# Patient Record
Sex: FEMALE | URBAN - METROPOLITAN AREA
[De-identification: names, ages, dates, MRNs, and addresses within clinical notes are randomized per-mention and may not be internally consistent; named-entity substitution may affect disease eponyms.]

---

## 2022-12-08 ENCOUNTER — EMPLOYEE HEALTH (OUTPATIENT)
Dept: OTHER | Facility: HOSPITAL | Age: 27
End: 2022-12-08
Attending: PREVENTIVE MEDICINE

## 2022-12-08 DIAGNOSIS — Z01.84 IMMUNITY STATUS TESTING: ICD-10-CM

## 2022-12-08 DIAGNOSIS — Z11.1 SCREENING-PULMONARY TB: Primary | ICD-10-CM

## 2022-12-08 PROCEDURE — 86480 TB TEST CELL IMMUN MEASURE: CPT

## 2022-12-08 PROCEDURE — 86787 VARICELLA-ZOSTER ANTIBODY: CPT

## 2022-12-09 LAB — VZV IGG SER IA-ACNC: 998.8 (ref 165–?)

## 2022-12-12 LAB
M TB IFN-G CD4+ T-CELLS BLD-ACNC: 0.02 IU/ML
M TB TUBERC IFN-G BLD QL: NEGATIVE
M TB TUBERC IGNF/MITOGEN IGNF CONTROL: >10 IU/ML
QFT TB1 AG MINUS NIL: 0.01 IU/ML
QFT TB2 AG MINUS NIL: 0 IU/ML

## 2023-02-14 ENCOUNTER — TELEPHONE (OUTPATIENT)
Dept: INTERNAL MEDICINE CLINIC | Facility: HOSPITAL | Age: 28
End: 2023-02-14

## 2023-04-12 PROBLEM — R61 HYPERHIDROSIS: Status: ACTIVE | Noted: 2021-09-17

## 2023-04-12 PROBLEM — E66.9 OBESITY (BMI 35.0-39.9 WITHOUT COMORBIDITY): Status: ACTIVE | Noted: 2022-03-23

## 2023-04-12 PROBLEM — F41.1 GAD (GENERALIZED ANXIETY DISORDER): Status: ACTIVE | Noted: 2017-01-03

## 2023-04-17 ENCOUNTER — TELEPHONE (OUTPATIENT)
Dept: INTERNAL MEDICINE CLINIC | Facility: HOSPITAL | Age: 28
End: 2023-04-17

## 2023-06-07 ENCOUNTER — TELEPHONE (OUTPATIENT)
Dept: INTERNAL MEDICINE CLINIC | Facility: HOSPITAL | Age: 28
End: 2023-06-07

## 2023-06-07 DIAGNOSIS — Z20.822 SUSPECTED 2019 NOVEL CORONAVIRUS INFECTION: Primary | ICD-10-CM

## 2023-06-08 NOTE — TELEPHONE ENCOUNTER
Results and RTW guidelines:    COVID RESULT:    [x] Viewed by employee in [address] RTW plan and instructions as indicated on triage call. Manager notified. Estimated RTW date:   [] Discussed with employee   [x] Unable to reach by phone. Sent via [place] message      Test type:    [] Rapid         [] Alinity         [x] Outside test:     [x] Positive     - Employee should quarantine at home for at least 5 days (day 1 is day after sx onset) , follow the CDC guidelines for cleaning and                              quarantining; see CDC.gov   -This employee may RTW on day 6 if asymptomatic or mildly symptomatic (with improving symptoms). Call Employee Health on day 5 if unable to return on day 6 after                      symptom onset.    -This employee needs to call Employee Health on day 5 after symptom onset. The employee needs to be cleared by [facility]. - Monitor symptoms and temperature                 - Notify PCP of result                 - Seek emergent care with worsening symptoms   - If employee is still experiencing severe symptoms on day 5 must make a RTW appt with [facility], Employee will not be cleared if:    1. Has consistent cough, shortness of breath or fatigue that restricts your physical activities    2. Is still feeling \"unwell\"    3. Within 15 days of hospitalization for COVID    4. Within 20 days of intubation for COVID    5.  Still has a fever, vomiting or diarrhea   - Keep communication open with management about RTW and if symptoms worsen                - If outside testing completed, bring a copy of result to RTW appointment           Notes:     RTW PLAN:    [x]  If COVID positive results, off work minimum of 5 days from positive test or onset of symptoms (day 0)        On day 5, if asymptomatic or mildly symptomatic (with improving symptoms) may return to work day 6          On day 5, if symptomatic, call Employee Health for RTW screening        []  COVID positive result - call Employee Health on day 5 after symptom onset. The employee needs to be cleared by Employee Health to RTW. [] RTW immediately, continue to monitor for sx  [] RTW when sx improve; must be fever free for 24 hours w/o medications, Diarrhea/Vomiting free for 24 hours w/o medications  [] Alinity ordered; continue to monitor sx and call for new/worsening sx.   Discuss RTW guidelines with manager  [] May continue to work  [] Follow up with PCP  [] Home until further instruction from hotline with Alinity results  INSTRUCTIONS PROVIDED:  [x]  Plan as noted above  []  Length of time to obtain results   [x]  Quarantine instructions  [x]  Masking protocol   [x]  S/S of worsening infection/condition and importance of prompt medical re-evaluation including when to seek emergency care  [] If symptoms develop, stay home and call hotline for rapid test order    Estimated RTW date:  6/12    [] The employee voiced understanding of above plan/instructions  [x] Manager Notified

## 2023-07-05 ENCOUNTER — TELEPHONE (OUTPATIENT)
Dept: INTERNAL MEDICINE CLINIC | Facility: HOSPITAL | Age: 28
End: 2023-07-05

## 2023-07-05 NOTE — TELEPHONE ENCOUNTER
[x] [address] [facility]  []MILE   [] [facility]  Manager : Enedina Montana    HAVE YOU RECEIVED THE COVID-19 Vaccine? Yes [x]    No []          If yes, date(s) received: 12/21/20; 1/15/21; 10/1/21           Which vaccine:  Pfizer [x]     Raymond Buck []    J&J []      SYMPTOMS (reported via dashboard):  [] asymptomatic  [] symptomatic  [x] GI symptoms only    Symptom onset date: 7/5  Fever   > 100F             Yes []      Cough                          Yes []      Shortness of breath  Yes []      Congestion                 Yes []      Runny nose                Yes []        Loss of Smell              Yes []        Loss of Taste             Yes []       Sore throat                 Yes []       Fatigue                        Yes []       Body Aches                Yes []        Chills                           Yes []        Headache                   Yes []             GI symptoms             Yes [x]     No []                     Nausea   [x]          Vomiting            [x]                                    Diarrhea  []          Upset stomach [x]      Employee has positive COVID Exposure? Yes []     No [x]    Date of exposure:     []  Coworker                       [] patient                        [] Family/friend    Employee has a history of Covid?   Yes [x]     No []   If Yes, when: 6/7/23    When was the last shift you worked?: 7/3      PLAN:     COVID-19 testing ordered: [] Rapid    [] Alinity              Date test is to be taken:       []  Outside testing                           Notes:    INSTRUCTIONS PROVIDED:    []  Employee was instructed to call Central scheduling at [phone] or use [employer]estone to make an appointment for their testing and once at the site remain in their vehicle and call [phone] to register for the appointment  [x]  May return to work if employee remains fever, vomiting, and diarrhea free  []  May continue to work if remains asymptomatic and views negative result in Scheduling [employer]  []  Follow up for condition update when resulting  []  If symptoms develop, stay home and call hotline for rapid test order  []  If COVID positive results, off work minimum of 5 days from positive test or onset of symptoms (day 0)    []      On day 5, if asymptomatic or mildly symptomatic (with improving symptoms) may return to work day 6  []      On day 5, if symptomatic, call Employee Health for RTW screening        [x]  Plan noted above  []  Length of time to obtain results  []  Quarantine instructions  []  S/S of worsening infection/condition and importance of prompt medical re-evaluation including when to seek emergency care.    [] The employee voiced understanding

## 2023-07-24 ENCOUNTER — OFFICE VISIT (OUTPATIENT)
Dept: FAMILY MEDICINE CLINIC | Facility: CLINIC | Age: 28
End: 2023-07-24
Payer: COMMERCIAL

## 2023-07-24 VITALS
BODY MASS INDEX: 36.88 KG/M2 | HEIGHT: 67 IN | HEART RATE: 84 BPM | DIASTOLIC BLOOD PRESSURE: 76 MMHG | WEIGHT: 235 LBS | SYSTOLIC BLOOD PRESSURE: 126 MMHG | TEMPERATURE: 98 F

## 2023-07-24 DIAGNOSIS — F32.1 MODERATE MAJOR DEPRESSION (HCC): ICD-10-CM

## 2023-07-24 DIAGNOSIS — F41.1 GENERALIZED ANXIETY DISORDER: ICD-10-CM

## 2023-07-24 PROCEDURE — 3074F SYST BP LT 130 MM HG: CPT | Performed by: STUDENT IN AN ORGANIZED HEALTH CARE EDUCATION/TRAINING PROGRAM

## 2023-07-24 PROCEDURE — 3078F DIAST BP <80 MM HG: CPT | Performed by: STUDENT IN AN ORGANIZED HEALTH CARE EDUCATION/TRAINING PROGRAM

## 2023-07-24 PROCEDURE — 99214 OFFICE O/P EST MOD 30 MIN: CPT | Performed by: STUDENT IN AN ORGANIZED HEALTH CARE EDUCATION/TRAINING PROGRAM

## 2023-07-24 PROCEDURE — 3008F BODY MASS INDEX DOCD: CPT | Performed by: STUDENT IN AN ORGANIZED HEALTH CARE EDUCATION/TRAINING PROGRAM

## 2023-07-24 RX ORDER — ESCITALOPRAM OXALATE 5 MG/1
5 TABLET ORAL DAILY
Qty: 90 TABLET | Refills: 0 | Status: SHIPPED | OUTPATIENT
Start: 2023-07-24

## 2023-07-24 RX ORDER — FLUTICASONE PROPIONATE 50 MCG
2 SPRAY, SUSPENSION (ML) NASAL DAILY
COMMUNITY
Start: 2023-06-07

## 2023-07-24 RX ORDER — CHOLECALCIFEROL (VITAMIN D3) 125 MCG
3000 CAPSULE ORAL
COMMUNITY
Start: 2023-05-22

## 2023-10-12 ENCOUNTER — EKG ENCOUNTER (OUTPATIENT)
Dept: LAB | Age: 28
End: 2023-10-12
Attending: STUDENT IN AN ORGANIZED HEALTH CARE EDUCATION/TRAINING PROGRAM
Payer: COMMERCIAL

## 2023-10-12 DIAGNOSIS — F41.1 GENERALIZED ANXIETY DISORDER: ICD-10-CM

## 2023-10-12 DIAGNOSIS — F32.1 MODERATE MAJOR DEPRESSION (HCC): ICD-10-CM

## 2023-10-12 LAB
ATRIAL RATE: 70 BPM
P AXIS: 36 DEGREES
P-R INTERVAL: 166 MS
Q-T INTERVAL: 386 MS
QRS DURATION: 76 MS
QTC CALCULATION (BEZET): 416 MS
R AXIS: 26 DEGREES
T AXIS: 27 DEGREES
VENTRICULAR RATE: 70 BPM

## 2023-10-12 PROCEDURE — 93010 ELECTROCARDIOGRAM REPORT: CPT | Performed by: INTERNAL MEDICINE

## 2023-10-12 PROCEDURE — 93005 ELECTROCARDIOGRAM TRACING: CPT

## 2023-10-16 DIAGNOSIS — F32.1 MODERATE MAJOR DEPRESSION (HCC): ICD-10-CM

## 2023-10-16 DIAGNOSIS — F41.1 GENERALIZED ANXIETY DISORDER: ICD-10-CM

## 2023-10-17 RX ORDER — ESCITALOPRAM OXALATE 5 MG/1
5 TABLET ORAL DAILY
Qty: 90 TABLET | Refills: 0 | Status: SHIPPED | OUTPATIENT
Start: 2023-10-17 | End: 2024-01-18

## 2023-10-17 NOTE — TELEPHONE ENCOUNTER
Escitalopram 5 mg    Last time medication was refilled 7/24/23  Quantity and number of refills 90 w/ 0   Last OV 7/24/23  Next OV 10/26/23

## 2023-10-26 ENCOUNTER — OFFICE VISIT (OUTPATIENT)
Dept: FAMILY MEDICINE CLINIC | Facility: CLINIC | Age: 28
End: 2023-10-26

## 2023-10-26 VITALS
TEMPERATURE: 98 F | DIASTOLIC BLOOD PRESSURE: 84 MMHG | OXYGEN SATURATION: 96 % | HEART RATE: 84 BPM | HEIGHT: 67 IN | WEIGHT: 240 LBS | BODY MASS INDEX: 37.67 KG/M2 | RESPIRATION RATE: 16 BRPM | SYSTOLIC BLOOD PRESSURE: 124 MMHG

## 2023-10-26 DIAGNOSIS — F41.1 GENERALIZED ANXIETY DISORDER: ICD-10-CM

## 2023-10-26 DIAGNOSIS — Z00.00 WELLNESS EXAMINATION: Primary | ICD-10-CM

## 2023-10-26 DIAGNOSIS — F32.1 MODERATE MAJOR DEPRESSION (HCC): ICD-10-CM

## 2023-10-26 DIAGNOSIS — K52.9 CHRONIC DIARRHEA: ICD-10-CM

## 2023-10-26 PROCEDURE — 3079F DIAST BP 80-89 MM HG: CPT | Performed by: STUDENT IN AN ORGANIZED HEALTH CARE EDUCATION/TRAINING PROGRAM

## 2023-10-26 PROCEDURE — 99214 OFFICE O/P EST MOD 30 MIN: CPT | Performed by: STUDENT IN AN ORGANIZED HEALTH CARE EDUCATION/TRAINING PROGRAM

## 2023-10-26 PROCEDURE — 3074F SYST BP LT 130 MM HG: CPT | Performed by: STUDENT IN AN ORGANIZED HEALTH CARE EDUCATION/TRAINING PROGRAM

## 2023-10-26 PROCEDURE — 99395 PREV VISIT EST AGE 18-39: CPT | Performed by: STUDENT IN AN ORGANIZED HEALTH CARE EDUCATION/TRAINING PROGRAM

## 2023-10-26 PROCEDURE — 3008F BODY MASS INDEX DOCD: CPT | Performed by: STUDENT IN AN ORGANIZED HEALTH CARE EDUCATION/TRAINING PROGRAM

## 2023-10-30 ENCOUNTER — MED REC SCAN ONLY (OUTPATIENT)
Dept: FAMILY MEDICINE CLINIC | Facility: CLINIC | Age: 28
End: 2023-10-30

## 2023-12-07 ENCOUNTER — LAB REQUISITION (OUTPATIENT)
Dept: LAB | Facility: HOSPITAL | Age: 28
End: 2023-12-07
Payer: COMMERCIAL

## 2023-12-07 DIAGNOSIS — D48.5 NEOPLASM OF UNCERTAIN BEHAVIOR OF SKIN: ICD-10-CM

## 2023-12-07 PROCEDURE — 88305 TISSUE EXAM BY PATHOLOGIST: CPT | Performed by: STUDENT IN AN ORGANIZED HEALTH CARE EDUCATION/TRAINING PROGRAM

## 2023-12-11 ENCOUNTER — TELEPHONE (OUTPATIENT)
Dept: INTERNAL MEDICINE CLINIC | Facility: HOSPITAL | Age: 28
End: 2023-12-11

## 2023-12-11 NOTE — TELEPHONE ENCOUNTER
[x] [facility] [facility]  []MILE   [] [address] [facility]  Manager : Rosalind Limon    HAVE YOU RECEIVED THE COVID-19 Vaccine? Yes [x]    No []          If yes, date(s) received:  10/1/21          Which vaccine:  Pfizer [x]     Daniela Senate []    J&J []      SYMPTOMS (reported via dashboard):  [] asymptomatic  [] symptomatic  [x] GI symptoms only    Symptom onset date: 12/11  Fever   > 100F             Yes []      Cough                          Yes []      Shortness of breath  Yes []      Congestion                 Yes []      Runny nose                Yes []        Loss of Smell              Yes []        Loss of Taste             Yes []       Sore throat                 Yes []       Fatigue                        Yes []       Body Aches                Yes []        Chills                           Yes []        Headache                   Yes []             GI symptoms             Yes [x]     No []                     Nausea   [x]          Vomiting            [x]                                    Diarrhea  [x]          Upset stomach [x]      Employee has positive COVID Exposure? Yes []     No [x]    Date of exposure:   []  Coworker                       [] patient                        [] Family/friend    Employee has a history of Covid?   Yes [x]     No []   If Yes, when: 6/6/23    When was the last shift you worked?: 12/8      PLAN:     [id number] testing ordered: [] Rapid    [] Alinity              Date test is to be taken:       []  Outside testing                           Notes:    INSTRUCTIONS PROVIDED:    []  Employee was instructed to call Central scheduling at [phone] or use [employer] to make an appointment for their testing   [x]  May return to work if employee remains fever, vomiting, and diarrhea free  []  May continue to work if remains asymptomatic and views negative result in [address]  []  Follow up for condition update when resulting  []  If symptoms develop, stay home and call hotline for rapid test order  []  If COVID positive results, off work minimum of 5 days from positive test or onset of symptoms (day 0)     [x]  Plan noted above  []  Length of time to obtain results  []  Quarantine instructions  []  S/S of worsening infection/condition and importance of prompt medical re-evaluation including when to seek emergency care.    [x] The employee voiced understanding

## 2023-12-22 ENCOUNTER — MED REC SCAN ONLY (OUTPATIENT)
Dept: FAMILY MEDICINE CLINIC | Facility: CLINIC | Age: 28
End: 2023-12-22

## 2024-03-01 PROCEDURE — 88305 TISSUE EXAM BY PATHOLOGIST: CPT | Performed by: STUDENT IN AN ORGANIZED HEALTH CARE EDUCATION/TRAINING PROGRAM

## 2024-03-04 ENCOUNTER — LAB REQUISITION (OUTPATIENT)
Dept: LAB | Facility: HOSPITAL | Age: 29
End: 2024-03-04
Payer: COMMERCIAL

## 2024-03-04 DIAGNOSIS — D48.5 NEOPLASM OF UNCERTAIN BEHAVIOR OF SKIN: ICD-10-CM

## 2024-05-08 DIAGNOSIS — F32.1 MODERATE MAJOR DEPRESSION (HCC): ICD-10-CM

## 2024-05-08 DIAGNOSIS — F41.1 GENERALIZED ANXIETY DISORDER: ICD-10-CM

## 2024-05-08 NOTE — TELEPHONE ENCOUNTER
Last office visit: 10/26/2023  Last Refill: 1/18/2024  Return To Clinic: 4/26/2024  Protocol:   Medication Quantity Refills Start End   ESCITALOPRAM 5 MG Oral Tab 90 tablet 0 1/18/2024 --   Sig:   TAKE 1 TABLET (5 MG TOTAL) BY MOUTH DAILY.     Route:   Oral       Please call patient to schedule medication follow up then route to Dr. Ivan

## 2024-05-09 RX ORDER — ESCITALOPRAM OXALATE 5 MG/1
5 TABLET ORAL DAILY
Qty: 90 TABLET | Refills: 0 | Status: SHIPPED | OUTPATIENT
Start: 2024-05-09

## 2024-05-16 ENCOUNTER — LAB ENCOUNTER (OUTPATIENT)
Dept: LAB | Age: 29
End: 2024-05-16
Attending: STUDENT IN AN ORGANIZED HEALTH CARE EDUCATION/TRAINING PROGRAM

## 2024-05-16 DIAGNOSIS — K52.9 CHRONIC DIARRHEA: ICD-10-CM

## 2024-05-16 DIAGNOSIS — Z00.00 WELLNESS EXAMINATION: ICD-10-CM

## 2024-05-16 LAB
ALBUMIN SERPL-MCNC: 4.6 G/DL (ref 3.4–5)
ALBUMIN/GLOB SERPL: 1.3 {RATIO} (ref 1–2)
ALP LIVER SERPL-CCNC: 91 U/L
ALT SERPL-CCNC: 31 U/L
ANION GAP SERPL CALC-SCNC: 9 MMOL/L (ref 0–18)
AST SERPL-CCNC: 12 U/L (ref 15–37)
BASOPHILS # BLD AUTO: 0.04 X10(3) UL (ref 0–0.2)
BASOPHILS NFR BLD AUTO: 0.4 %
BILIRUB SERPL-MCNC: 0.6 MG/DL (ref 0.1–2)
BUN BLD-MCNC: 10 MG/DL (ref 9–23)
CALCIUM BLD-MCNC: 9.5 MG/DL (ref 8.5–10.1)
CHLORIDE SERPL-SCNC: 106 MMOL/L (ref 98–112)
CHOLEST SERPL-MCNC: 215 MG/DL (ref ?–200)
CO2 SERPL-SCNC: 24 MMOL/L (ref 21–32)
CREAT BLD-MCNC: 0.66 MG/DL
EGFRCR SERPLBLD CKD-EPI 2021: 123 ML/MIN/1.73M2 (ref 60–?)
EOSINOPHIL # BLD AUTO: 0.03 X10(3) UL (ref 0–0.7)
EOSINOPHIL NFR BLD AUTO: 0.3 %
ERYTHROCYTE [DISTWIDTH] IN BLOOD BY AUTOMATED COUNT: 12.2 %
FASTING PATIENT LIPID ANSWER: YES
FASTING STATUS PATIENT QL REPORTED: YES
GLOBULIN PLAS-MCNC: 3.5 G/DL (ref 2.8–4.4)
GLUCOSE BLD-MCNC: 85 MG/DL (ref 70–99)
HCT VFR BLD AUTO: 40.5 %
HDLC SERPL-MCNC: 40 MG/DL (ref 40–59)
HGB BLD-MCNC: 14.1 G/DL
IGA SERPL-MCNC: 134.4 MG/DL (ref 70–312)
IMM GRANULOCYTES # BLD AUTO: 0.03 X10(3) UL (ref 0–1)
IMM GRANULOCYTES NFR BLD: 0.3 %
LDLC SERPL CALC-MCNC: 143 MG/DL (ref ?–100)
LYMPHOCYTES # BLD AUTO: 2.55 X10(3) UL (ref 1–4)
LYMPHOCYTES NFR BLD AUTO: 24.6 %
MCH RBC QN AUTO: 31.1 PG (ref 26–34)
MCHC RBC AUTO-ENTMCNC: 34.8 G/DL (ref 31–37)
MCV RBC AUTO: 89.2 FL
MONOCYTES # BLD AUTO: 0.65 X10(3) UL (ref 0.1–1)
MONOCYTES NFR BLD AUTO: 6.3 %
NEUTROPHILS # BLD AUTO: 7.07 X10 (3) UL (ref 1.5–7.7)
NEUTROPHILS # BLD AUTO: 7.07 X10(3) UL (ref 1.5–7.7)
NEUTROPHILS NFR BLD AUTO: 68.1 %
NONHDLC SERPL-MCNC: 175 MG/DL (ref ?–130)
OSMOLALITY SERPL CALC.SUM OF ELEC: 286 MOSM/KG (ref 275–295)
PLATELET # BLD AUTO: 390 10(3)UL (ref 150–450)
POTASSIUM SERPL-SCNC: 3.5 MMOL/L (ref 3.5–5.1)
PROT SERPL-MCNC: 8.1 G/DL (ref 6.4–8.2)
RBC # BLD AUTO: 4.54 X10(6)UL
SODIUM SERPL-SCNC: 139 MMOL/L (ref 136–145)
T4 FREE SERPL-MCNC: 1.1 NG/DL (ref 0.8–1.7)
TRIGL SERPL-MCNC: 178 MG/DL (ref 30–149)
TSI SER-ACNC: 1.91 MIU/ML (ref 0.36–3.74)
VLDLC SERPL CALC-MCNC: 33 MG/DL (ref 0–30)
WBC # BLD AUTO: 10.4 X10(3) UL (ref 4–11)

## 2024-05-16 PROCEDURE — 86364 TISS TRNSGLTMNASE EA IG CLAS: CPT

## 2024-05-16 PROCEDURE — 84443 ASSAY THYROID STIM HORMONE: CPT

## 2024-05-16 PROCEDURE — 36415 COLL VENOUS BLD VENIPUNCTURE: CPT

## 2024-05-16 PROCEDURE — 85025 COMPLETE CBC W/AUTO DIFF WBC: CPT

## 2024-05-16 PROCEDURE — 80061 LIPID PANEL: CPT

## 2024-05-16 PROCEDURE — 84439 ASSAY OF FREE THYROXINE: CPT

## 2024-05-16 PROCEDURE — 80053 COMPREHEN METABOLIC PANEL: CPT

## 2024-05-16 PROCEDURE — 82784 ASSAY IGA/IGD/IGG/IGM EACH: CPT

## 2024-05-17 LAB — TTG IGA SER-ACNC: 0.2 U/ML (ref ?–7)

## 2024-09-09 ENCOUNTER — TELEPHONE (OUTPATIENT)
Dept: FAMILY MEDICINE CLINIC | Facility: CLINIC | Age: 29
End: 2024-09-09

## 2024-09-09 ENCOUNTER — OFFICE VISIT (OUTPATIENT)
Dept: FAMILY MEDICINE CLINIC | Facility: CLINIC | Age: 29
End: 2024-09-09
Payer: COMMERCIAL

## 2024-09-09 VITALS
HEIGHT: 67 IN | BODY MASS INDEX: 36.73 KG/M2 | TEMPERATURE: 97 F | SYSTOLIC BLOOD PRESSURE: 108 MMHG | WEIGHT: 234 LBS | HEART RATE: 90 BPM | RESPIRATION RATE: 16 BRPM | DIASTOLIC BLOOD PRESSURE: 56 MMHG

## 2024-09-09 DIAGNOSIS — R10.2 SUPRAPUBIC PAIN: ICD-10-CM

## 2024-09-09 DIAGNOSIS — R10.32 LLQ PAIN: ICD-10-CM

## 2024-09-09 DIAGNOSIS — M54.6 ACUTE LEFT-SIDED THORACIC BACK PAIN: ICD-10-CM

## 2024-09-09 DIAGNOSIS — R07.89 LEFT-SIDED CHEST WALL PAIN: ICD-10-CM

## 2024-09-09 DIAGNOSIS — R31.29 OTHER MICROSCOPIC HEMATURIA: ICD-10-CM

## 2024-09-09 DIAGNOSIS — R10.12 COLICKY LUQ ABDOMINAL PAIN: Primary | ICD-10-CM

## 2024-09-09 DIAGNOSIS — R10.12 LUQ PAIN: ICD-10-CM

## 2024-09-09 DIAGNOSIS — R10.13 EPIGASTRIC ABDOMINAL PAIN: ICD-10-CM

## 2024-09-09 LAB
APPEARANCE: CLEAR
BILIRUBIN: NEGATIVE
CONTROL LINE PRESENT WITH A CLEAR BACKGROUND (YES/NO): YES YES/NO
GLUCOSE (URINE DIPSTICK): NEGATIVE MG/DL
KETONES (URINE DIPSTICK): NEGATIVE MG/DL
KIT LOT #: NORMAL NUMERIC
LEUKOCYTES: NEGATIVE
MULTISTIX LOT#: ABNORMAL NUMERIC
NITRITE, URINE: NEGATIVE
PH, URINE: 6.5 (ref 4.5–8)
PREGNANCY TEST, URINE: NEGATIVE
PROTEIN (URINE DIPSTICK): NEGATIVE MG/DL
SPECIFIC GRAVITY: 1.01 (ref 1–1.03)
URINE-COLOR: YELLOW
UROBILINOGEN,SEMI-QN: 0.2 MG/DL (ref 0–1.9)

## 2024-09-09 PROCEDURE — 87086 URINE CULTURE/COLONY COUNT: CPT | Performed by: STUDENT IN AN ORGANIZED HEALTH CARE EDUCATION/TRAINING PROGRAM

## 2024-09-09 RX ORDER — CETIRIZINE HYDROCHLORIDE 10 MG/1
TABLET ORAL
COMMUNITY

## 2024-09-09 NOTE — PROGRESS NOTES
[facility] Family Medicine Note  09/09/24    Chief Complaint   Patient presents with    Abdominal Pain     X6 days.  Pain on left side and radiates to back.       HPI:   Marjorie Lundy is a 29 year old female who presents for LUQ pain.    Was vomiting last week after a stomach bug.     Started in the front about a week ago. It was coming and going. Now it moved to the left middle back.  It is a dull throbbing. It is mild. Gets up to a 5-6/10 and burns a bit.     Tried ibuprofen and it hasn't help. Was nauseated this morning. Small emesis this morning. Tried icy hot without much help. Having sharper pain in left middle back.    Appetite fine. No worse pain with eating.     No rashes or bruising.    No recent travel.    No known sick contacts.     Bowel movements tend softer to looser. Lately some cramping that improves after bowel movement. Normal color.    No urinary issues.    Has had back flare ups, so was taking more ibuprofen.    No chest pain, shortness of breath, dizziness.     Wt Readings from Last 6 Encounters:   09/09/24 234 lb (106.1 kg)   05/23/24 235 lb 3.2 oz (106.7 kg)   10/26/23 240 lb (108.9 kg)   07/24/23 235 lb (106.6 kg)   04/12/23 237 lb (107.5 kg)       Past Medical History:    Allergic rhinitis    Anxiety    Depression    Esophageal reflux    Exercise-induced asthma (HCC)     Past Surgical History:   Procedure Laterality Date    Skin surgery  6/11/2015     Allergies   Allergen Reactions    Dairy Products DIARRHEA and NAUSEA AND VOMITING    Lactose NAUSEA AND VOMITING    Gluten Meal RASH     Irritability      cetirizine (ZYRTEC ALLERGY) 10 MG Oral Tab       escitalopram 5 MG Oral Tab Take 1 tablet (5 mg total) by mouth daily. 90 tablet 1    lactase (LACTAID) 3000 units Oral Tab Take 1 tablet (3,000 Units total) by mouth.      famotidine (PEPCID) 20 MG Oral Tab Take 1 tablet (20 mg total) by mouth as needed.       Social History     Socioeconomic History    Marital status:     Tobacco Use    Smoking status: Never     Passive exposure: Never    Smokeless tobacco: Never   Vaping Use    Vaping status: Never Used   Substance and Sexual Activity    Alcohol use: Yes     Alcohol/week: 1.0 - 2.0 standard drink of alcohol     Types: 1 - 2 Cans of beer per week     Comment: Rarely    Drug use: Never    Sexual activity: Yes     Partners: Male   Other Topics Concern    Caffeine Concern No    Stress Concern Yes    Weight Concern Yes     Comment: Trouble losing weight    Special Diet Yes     Comment: Mostly vegan    Exercise Yes    Seat Belt Yes    Self-Exams Yes     Counseling given: Not Answered    Family History   Problem Relation Age of Onset    Hypertension Father     Lipids Father     Cancer Maternal Grandfather         CLL    Diabetes Maternal Grandfather         Type 2, never on insulin    Heart Disorder Maternal Grandmother         A fib, heart failure    Lipids Maternal Grandmother     Pulmonary Disease Paternal Grandmother         Copd-chronic smoker     Family Status   Relation Status    Fa (Not Specified)    MGFA (Not Specified)    MGMA (Not Specified)    PGMA (Not Specified)        REVIEW OF SYSTEMS:   See HPI    EXAM:   /56   Pulse 90   Temp 97 °F (36.1 °C) (Temporal)   Resp 16   Ht 5' 7\" (1.702 m)   Wt 234 lb (106.1 kg)   LMP 08/17/2024 (Exact Date)   BMI 36.65 kg/m²  Estimated body mass index is 36.65 kg/m² as calculated from the following:    Height as of this encounter: 5' 7\" (1.702 m).    Weight as of this encounter: 234 lb (106.1 kg).   Vital signs reviewed. Appears stated age, well groomed.  Physical Exam:  GEN:  Patient is alert and oriented x3, no apparent distress  HEAD:  Normocephalic, atraumatic  HEENT:  no scleral icterus, conjunctivae clear bilaterally  LUNGS: clear to auscultation bilaterally, no rales/rhonchi/wheezing  HEART:  Regular rate and rhythm, normal S1/S2, no murmurs, rubs or gallops  ABDOMEN:  Bowel sounds normal, soft, nondistended,  +epigastric tenderness, +LUQ tenderness, + LLQ tenderness, + suprapubic tenderness, + L CVA tenderness  CHEST: + left lower ribs tenderness  BACK: + left middle back tenderness  EXTREMITIES:  Moves all extremities well, no edema, +SLR on left, negative SLR on right  NEURO:  CN 2 - 12 grossly intact, gait normal      ASSESSMENT AND PLAN:   Patient is a 29-year-old female who presents with 1 week of left upper quadrant pain that is now present to the left middle back and is worsening, urine dip in the office shows blood.  Differential diagnosis includes nephrolithiasis, peptic ulcer disease, pancreatitis, diverticulitis.  Will check CBC, CMP, lipase, urine culture.  Will check stat CT kidney stone protocol to evaluate further.  If any worsening symptoms prior to CT scan patient to proceed to the ER.  Further recommendations pending results.  1. Colicky LUQ abdominal pain  - URINALYSIS, AUTO, W/O SCOPE  - Urine Culture, Routine; Future  - HCG, Beta Subunit (Quant Pregnancy Test) [E]; Future  - Comp Metabolic Panel (14) [E]; Future  - CBC W Differential W Platelet [E]; Future  - Lipase [E]; Future  - CT ABDOMEN+PELVIS KIDNEYSTONE 2D RNDR(NO IV,NO ORAL)(CPT=74176); Future  - Urine Preg Test    2. LUQ pain  - URINALYSIS, AUTO, W/O SCOPE  - Urine Culture, Routine; Future  - HCG, Beta Subunit (Quant Pregnancy Test) [E]; Future  - Comp Metabolic Panel (14) [E]; Future  - CBC W Differential W Platelet [E]; Future  - Lipase [E]; Future  - CT ABDOMEN+PELVIS KIDNEYSTONE 2D RNDR(NO IV,NO ORAL)(CPT=74176); Future  - Urine Preg Test    3. LLQ pain  - URINALYSIS, AUTO, W/O SCOPE  - Urine Culture, Routine; Future  - HCG, Beta Subunit (Quant Pregnancy Test) [E]; Future  - Comp Metabolic Panel (14) [E]; Future  - CBC W Differential W Platelet [E]; Future  - Lipase [E]; Future  - CT ABDOMEN+PELVIS KIDNEYSTONE 2D RNDR(NO IV,NO ORAL)(CPT=74176); Future  - Urine Preg Test    4. Suprapubic pain  - URINALYSIS, AUTO, W/O SCOPE  - Urine  Culture, Routine; Future  - HCG, Beta Subunit (Quant Pregnancy Test) [E]; Future  - Comp Metabolic Panel (14) [E]; Future  - CBC W Differential W Platelet [E]; Future  - Lipase [E]; Future  - CT ABDOMEN+PELVIS KIDNEYSTONE 2D RNDR(NO IV,NO ORAL)(CPT=74176); Future  - Urine Preg Test    5. Acute left-sided thoracic back pain  - URINALYSIS, AUTO, W/O SCOPE  - Urine Culture, Routine; Future  - HCG, Beta Subunit (Quant Pregnancy Test) [E]; Future  - Comp Metabolic Panel (14) [E]; Future  - CBC W Differential W Platelet [E]; Future  - Lipase [E]; Future  - CT ABDOMEN+PELVIS KIDNEYSTONE 2D RNDR(NO IV,NO ORAL)(CPT=74176); Future  - Urine Preg Test    6. Left-sided chest wall pain  - URINALYSIS, AUTO, W/O SCOPE  - Urine Culture, Routine; Future  - HCG, Beta Subunit (Quant Pregnancy Test) [E]; Future  - Comp Metabolic Panel (14) [E]; Future  - CBC W Differential W Platelet [E]; Future  - Lipase [E]; Future  - CT ABDOMEN+PELVIS KIDNEYSTONE 2D RNDR(NO IV,NO ORAL)(CPT=74176); Future  - Urine Preg Test    7. Epigastric abdominal pain  - URINALYSIS, AUTO, W/O SCOPE  - Urine Culture, Routine; Future  - HCG, Beta Subunit (Quant Pregnancy Test) [E]; Future  - Comp Metabolic Panel (14) [E]; Future  - CBC W Differential W Platelet [E]; Future  - Lipase [E]; Future  - CT ABDOMEN+PELVIS KIDNEYSTONE 2D RNDR(NO IV,NO ORAL)(CPT=74176); Future  - Urine Preg Test    8. Other microscopic hematuria  - URINALYSIS, AUTO, W/O SCOPE  - Urine Culture, Routine; Future  - HCG, Beta Subunit (Quant Pregnancy Test) [E]; Future  - Comp Metabolic Panel (14) [E]; Future  - CBC W Differential W Platelet [E]; Future  - Lipase [E]; Future  - CT ABDOMEN+PELVIS KIDNEYSTONE 2D RNDR(NO IV,NO ORAL)(CPT=74176); Future  - Urine Preg Test          Meds & Refills for this Visit:  Requested Prescriptions      No prescriptions requested or ordered in this encounter       Stop Taking                fexofenadine 180 MG Oral Tab    Take 1 tablet (180 mg total) by mouth  daily.            Health Maintenance:  Health Maintenance Due   Topic Date Due    COVID-19 Vaccine (2 - 2023-24 season) 09/01/2024    Annual Physical  10/26/2024       Patient/Caregiver Education: There are no barriers to learning. Medical education done.   Outcome: Patient verbalizes understanding. Patient is notified to call with any questions, complications, allergies, or worsening or changing symptoms.  Patient is to call with any side effects or complications from the treatments as a result of today.     Problem List:  Patient Active Problem List   Diagnosis    Allergic rhinitis    JOEL (generalized anxiety disorder)    Hyperhidrosis    Obesity (BMI 35.0-39.9 without comorbidity)       Return for pending results.    Alfreda Ivan MD  [facility]  09/09/24      Please note that portions of this note may have been completed with a voice recognition program. Efforts were made to edit the dictations but occasionally words are mis-transcribed. Thank you for your understanding.

## 2024-09-09 NOTE — TELEPHONE ENCOUNTER
Pt made mychart appt with following message     Appointment For: Marjorie Lundy ([id number])   Visit Type: MYCHART EXAM (2964)      9/12/2024    3:00 PM  15 mins.  [facility]        [facility] [address]      Patient Comments:   Dull LUQ pain for 1 week

## 2024-09-09 NOTE — TELEPHONE ENCOUNTER
I could double book at 4:45pm or 5pm today but if any worsening symptoms should go to IC or ER if after hours. Thank you.

## 2024-09-09 NOTE — TELEPHONE ENCOUNTER
Left upper quadrant abdominal pain  x 1 week. Some nausea today. She did mention she had a GI issue last week with some vomiting. No fever. She feels this is getting a little more frequent. See on 09/12 or see here sooner?

## 2024-09-09 NOTE — TELEPHONE ENCOUNTER
I called the patient and made her an appointment for today with Dr. Ivan at 4:45 pm. Pt. Agreed to plan and verbalized understanding

## 2024-09-10 ENCOUNTER — LAB ENCOUNTER (OUTPATIENT)
Dept: LAB | Age: 29
End: 2024-09-10
Attending: STUDENT IN AN ORGANIZED HEALTH CARE EDUCATION/TRAINING PROGRAM
Payer: COMMERCIAL

## 2024-09-10 ENCOUNTER — TELEPHONE (OUTPATIENT)
Dept: FAMILY MEDICINE CLINIC | Facility: CLINIC | Age: 29
End: 2024-09-10

## 2024-09-10 ENCOUNTER — HOSPITAL ENCOUNTER (OUTPATIENT)
Dept: CT IMAGING | Facility: HOSPITAL | Age: 29
Discharge: HOME | End: 2024-09-10
Attending: STUDENT IN AN ORGANIZED HEALTH CARE EDUCATION/TRAINING PROGRAM
Payer: COMMERCIAL

## 2024-09-10 DIAGNOSIS — R07.89 LEFT-SIDED CHEST WALL PAIN: ICD-10-CM

## 2024-09-10 DIAGNOSIS — M54.6 ACUTE LEFT-SIDED THORACIC BACK PAIN: ICD-10-CM

## 2024-09-10 DIAGNOSIS — R10.12 LUQ PAIN: ICD-10-CM

## 2024-09-10 DIAGNOSIS — R10.12 COLICKY LUQ ABDOMINAL PAIN: ICD-10-CM

## 2024-09-10 DIAGNOSIS — R10.2 SUPRAPUBIC PAIN: ICD-10-CM

## 2024-09-10 DIAGNOSIS — R10.13 EPIGASTRIC ABDOMINAL PAIN: ICD-10-CM

## 2024-09-10 DIAGNOSIS — R10.32 LLQ PAIN: ICD-10-CM

## 2024-09-10 DIAGNOSIS — R31.29 OTHER MICROSCOPIC HEMATURIA: ICD-10-CM

## 2024-09-10 LAB
ALBUMIN SERPL-MCNC: 5.1 G/DL (ref 3.2–4.8)
ALBUMIN/GLOB SERPL: 1.8 {RATIO} (ref 1–2)
ALP LIVER SERPL-CCNC: 83 U/L
ALT SERPL-CCNC: 24 U/L
ANION GAP SERPL CALC-SCNC: 8 MMOL/L (ref 0–18)
AST SERPL-CCNC: 14 U/L (ref ?–34)
BASOPHILS # BLD AUTO: 0.03 X10(3) UL (ref 0–0.2)
BASOPHILS NFR BLD AUTO: 0.3 %
BILIRUB SERPL-MCNC: 0.5 MG/DL (ref 0.3–1.2)
BUN BLD-MCNC: 11 MG/DL (ref 9–23)
CALCIUM BLD-MCNC: 10.5 MG/DL (ref 8.7–10.4)
CHLORIDE SERPL-SCNC: 104 MMOL/L (ref 98–112)
CO2 SERPL-SCNC: 27 MMOL/L (ref 21–32)
CREAT BLD-MCNC: 0.72 MG/DL
EGFRCR SERPLBLD CKD-EPI 2021: 117 ML/MIN/1.73M2 (ref 60–?)
EOSINOPHIL # BLD AUTO: 0.07 X10(3) UL (ref 0–0.7)
EOSINOPHIL NFR BLD AUTO: 0.7 %
ERYTHROCYTE [DISTWIDTH] IN BLOOD BY AUTOMATED COUNT: 12.6 %
FASTING STATUS PATIENT QL REPORTED: YES
GLOBULIN PLAS-MCNC: 2.9 G/DL (ref 2–3.5)
GLUCOSE BLD-MCNC: 72 MG/DL (ref 70–99)
HCT VFR BLD AUTO: 41 %
HGB BLD-MCNC: 14.1 G/DL
IMM GRANULOCYTES # BLD AUTO: 0.03 X10(3) UL (ref 0–1)
IMM GRANULOCYTES NFR BLD: 0.3 %
LIPASE SERPL-CCNC: 28 U/L (ref 12–53)
LYMPHOCYTES # BLD AUTO: 2.99 X10(3) UL (ref 1–4)
LYMPHOCYTES NFR BLD AUTO: 29.6 %
MCH RBC QN AUTO: 31 PG (ref 26–34)
MCHC RBC AUTO-ENTMCNC: 34.4 G/DL (ref 31–37)
MCV RBC AUTO: 90.1 FL
MONOCYTES # BLD AUTO: 0.77 X10(3) UL (ref 0.1–1)
MONOCYTES NFR BLD AUTO: 7.6 %
NEUTROPHILS # BLD AUTO: 6.2 X10 (3) UL (ref 1.5–7.7)
NEUTROPHILS # BLD AUTO: 6.2 X10(3) UL (ref 1.5–7.7)
NEUTROPHILS NFR BLD AUTO: 61.5 %
OSMOLALITY SERPL CALC.SUM OF ELEC: 286 MOSM/KG (ref 275–295)
PLATELET # BLD AUTO: 320 10(3)UL (ref 150–450)
POTASSIUM SERPL-SCNC: 3.8 MMOL/L (ref 3.5–5.1)
PROT SERPL-MCNC: 8 G/DL (ref 5.7–8.2)
RBC # BLD AUTO: 4.55 X10(6)UL
SODIUM SERPL-SCNC: 139 MMOL/L (ref 136–145)
WBC # BLD AUTO: 10.1 X10(3) UL (ref 4–11)

## 2024-09-10 PROCEDURE — 80053 COMPREHEN METABOLIC PANEL: CPT

## 2024-09-10 PROCEDURE — 83690 ASSAY OF LIPASE: CPT

## 2024-09-10 PROCEDURE — 85025 COMPLETE CBC W/AUTO DIFF WBC: CPT

## 2024-09-10 PROCEDURE — 74176 CT ABD & PELVIS W/O CONTRAST: CPT | Performed by: STUDENT IN AN ORGANIZED HEALTH CARE EDUCATION/TRAINING PROGRAM

## 2024-09-10 PROCEDURE — 36415 COLL VENOUS BLD VENIPUNCTURE: CPT

## 2024-09-10 NOTE — TELEPHONE ENCOUNTER
Patient called back, informed of CT scan that did not show any acute process as per . Also informed of recommendations below.    Per patient, she prefers to get omeprazole 20mg OTC. Agreed to take 2 tablets daily for 2 weeks and to call with updates.

## 2024-09-10 NOTE — TELEPHONE ENCOUNTER
Please call patient notify her that CT scan did not show any acute process.  Per    Recommendation we can start omeprazole 40 mg 1 tablet daily # 30 we can call it as  a prescription or patient can  over-the-counter omeprazole 20 mg and take 2 tablets a day for 2 weeks and call with update.    Thank you

## 2024-09-10 NOTE — TELEPHONE ENCOUNTER
[facility] giving CT Abd/Pelv results:    \"No acute abdominal pelvic process\"  Routing to on-call physician

## 2024-11-07 ENCOUNTER — OFFICE VISIT (OUTPATIENT)
Dept: FAMILY MEDICINE CLINIC | Facility: CLINIC | Age: 29
End: 2024-11-07
Payer: COMMERCIAL

## 2024-11-07 VITALS
HEIGHT: 66.93 IN | WEIGHT: 234.25 LBS | RESPIRATION RATE: 16 BRPM | HEART RATE: 81 BPM | BODY MASS INDEX: 36.76 KG/M2 | DIASTOLIC BLOOD PRESSURE: 78 MMHG | TEMPERATURE: 97 F | SYSTOLIC BLOOD PRESSURE: 120 MMHG

## 2024-11-07 DIAGNOSIS — F41.1 GENERALIZED ANXIETY DISORDER: ICD-10-CM

## 2024-11-07 DIAGNOSIS — Z00.00 WELLNESS EXAMINATION: Primary | ICD-10-CM

## 2024-11-07 DIAGNOSIS — F33.42 RECURRENT MAJOR DEPRESSIVE DISORDER, IN FULL REMISSION (HCC): ICD-10-CM

## 2024-11-07 PROCEDURE — 99395 PREV VISIT EST AGE 18-39: CPT | Performed by: STUDENT IN AN ORGANIZED HEALTH CARE EDUCATION/TRAINING PROGRAM

## 2024-11-07 RX ORDER — ESCITALOPRAM OXALATE 5 MG/1
5 TABLET ORAL DAILY
Qty: 90 TABLET | Refills: 1 | Status: SHIPPED | OUTPATIENT
Start: 2024-11-07

## 2024-11-07 NOTE — PATIENT INSTRUCTIONS
Refill policies:      Allow 3 business days for refills; controlled substances may take longer.  Contact your pharmacy at least 5-7 business days prior to running out of medication and have them send an electronic request or submit through the \"request refill\" option thru your [employer] account. No need to do both, as multiple requests will create an automated [employer] message to notify of a denial for one of the duplicated requests, causing you undue confusion.   Refills are NOT addressed on weekends; covering physicians do not authorize routine medications on weekends.  No narcotics or controlled substances are refilled after noon on Fridays or by on call physicians.  By law, narcotics cannot be faxed or phoned into your pharmacy.  If your prescription is due for a refill, you may be due for a follow up appointment. Please call our office at [phone] to make an appointment or schedule an appointment via [employer].  To best provide you care, patients receiving routine medications need to be seen at least twice a year. Patients receiving narcotic/controlled substance medications need to be seen at least once every 3 months.  In the event that your preferred pharmacy does not have the requested medication in stock (ie Backordered), it is your responsibility to find another pharmacy that has the requested medication available. We will gladly send a new prescription to that pharmacy at your request.  controlled substances may not be able to be filled out of state due to license restrictions.  If you have a planned trip, it's best to call your pharmacy at least 5-7 business days to prevent any delays in your medication refill.    Scheduling Tests:    If your physician has ordered radiology tests such as MRI or CT scans, please contact Central Scheduling at [phone] right away to schedule the test.  Once scheduled, the [facility] Centralized Referral Team will work with your insurance carrier to obtain pre-certification or  prior authorization.  Depending on your insurance carrier, approval may take 3-10 days.  It is highly recommended patients assure they have received an authorization before having a test performed.  If test is done without insurance authorization, patient may be responsible for the entire amount billed.      Precertification and Prior Authorizations:  If your physician has recommended that you have a procedure or additional testing performed the [facility] Centralized Referral Team will contact your insurance carrier to obtain pre-certification or prior authorization.    You are strongly encouraged to contact your insurance carrier to verify that your procedure/test has been approved and is a COVERED benefit.  Although the [facility] Centralized Referral Team does its due diligence, the insurance carrier gives the disclaimer that \"Although the procedure is authorized, this does not guarantee payment.\"    Ultimately the patient is responsible for payment.   Thank you for your understanding in this matter.  Paperwork Completion:  If you require FMLA or disability paperwork for your recovery, please make sure to either drop it off or have it faxed to our office at [phone]. Be sure the form has your name and date of birth on it.  The form will be faxed to our Forms Department and they will complete it for you.  There is a 25$ fee for all forms that need to be filled out.  Please be aware there is a 10-14 day turnaround time.  You will need to sign a release of information (DIMITRI) form if your paperwork does not come with one.  You may call the Forms Department with any questions at [phone].  Their fax number is [phone].

## 2024-11-07 NOTE — PROGRESS NOTES
[facility]  11/07/24    Chief Complaint   Patient presents with    Physical     HPI:   Marjorie Lundy is a 29 year old female who presents for a complete physical exam.     Doing well. No further pain.    Med/Surg/Allergy/Fam hx updates: denied  Home: lives with , three cats, 1 year anniversary is next month  Work: going alright  Activities/Hobbies: yes  Diet: healthy  - more home cooking, having lean protein  Exercise: wants to do more; had walking pad  Sleep: good  Mood: see below  Habits: rare alcohol, denied tobacco, or drug use.  Periods: Patient's last menstrual period was 10/22/2024 (exact date). Monthly. To see gynecology.   Immunizations: got flu shot  Screenings: up to date with pap    Taking lexapro 5mg daily it is working.    Zyrtec daily. Rare flonase.    Pepcid as needed.    Sometimes low back pain flares. Better with staying active. Foam roller.     Wt Readings from Last 6 Encounters:   11/07/24 234 lb 3.5 oz (106.2 kg)   09/09/24 234 lb (106.1 kg)   05/23/24 235 lb 3.2 oz (106.7 kg)   10/26/23 240 lb (108.9 kg)   07/24/23 235 lb (106.6 kg)   04/12/23 237 lb (107.5 kg)     Body mass index is 36.76 kg/m².     Results for orders placed or performed in visit on 09/10/24   Comp Metabolic Panel (14) [E]    Collection Time: 09/10/24  1:05 PM   Result Value Ref Range    Glucose 72 70 - 99 mg/dL    Sodium 139 136 - 145 mmol/L    Potassium 3.8 3.5 - 5.1 mmol/L    Chloride 104 98 - 112 mmol/L    CO2 27.0 21.0 - 32.0 mmol/L    Anion Gap 8 0 - 18 mmol/L    BUN 11 9 - 23 mg/dL    Creatinine 0.72 0.55 - 1.02 mg/dL    Calcium, Total 10.5 (H) 8.7 - 10.4 mg/dL    Calculated Osmolality 286 275 - 295 mOsm/kg    eGFR-Cr 117 >=60 mL/min/1.73m2    AST 14 <34 U/L    ALT 24 10 - 49 U/L    Alkaline Phosphatase 83 37 - 98 U/L    Bilirubin, Total 0.5 0.3 - 1.2 mg/dL    Total Protein 8.0 5.7 - 8.2 g/dL    Albumin 5.1 (H) 3.2 - 4.8 g/dL    Globulin  2.9 2.0 - 3.5 g/dL    A/G Ratio 1.8 1.0 - 2.0     Patient Fasting for CMP? Yes    CBC W Differential W Platelet [E]    Collection Time: 09/10/24  1:05 PM   Result Value Ref Range    WBC 10.1 4.0 - 11.0 x10(3) uL    RBC 4.55 3.80 - 5.30 x10(6)uL    HGB 14.1 12.0 - 16.0 g/dL    HCT 41.0 35.0 - 48.0 %    .0 150.0 - 450.0 10(3)uL    MCV 90.1 80.0 - 100.0 fL    MCH 31.0 26.0 - 34.0 pg    MCHC 34.4 31.0 - 37.0 g/dL    RDW 12.6 %    Neutrophil Absolute Prelim 6.20 1.50 - 7.70 x10 (3) uL    Neutrophil Absolute 6.20 1.50 - 7.70 x10(3) uL    Lymphocyte Absolute 2.99 1.00 - 4.00 x10(3) uL    Monocyte Absolute 0.77 0.10 - 1.00 x10(3) uL    Eosinophil Absolute 0.07 0.00 - 0.70 x10(3) uL    Basophil Absolute 0.03 0.00 - 0.20 x10(3) uL    Immature Granulocyte Absolute 0.03 0.00 - 1.00 x10(3) uL    Neutrophil % 61.5 %    Lymphocyte % 29.6 %    Monocyte % 7.6 %    Eosinophil % 0.7 %    Basophil % 0.3 %    Immature Granulocyte % 0.3 %   Lipase [E]    Collection Time: 09/10/24  1:05 PM   Result Value Ref Range    Lipase 28 12 - 53 U/L       Current Outpatient Medications   Medication Sig Dispense Refill    escitalopram 5 MG Oral Tab Take 1 tablet (5 mg total) by mouth daily. 90 tablet 1    cetirizine (ZYRTEC ALLERGY) 10 MG Oral Tab       lactase (LACTAID) 3000 units Oral Tab Take 1 tablet (3,000 Units total) by mouth.      famotidine (PEPCID) 20 MG Oral Tab Take 1 tablet (20 mg total) by mouth as needed.        Allergies[1]   Past Medical History:    Allergic rhinitis    Anxiety    Depression    Esophageal reflux    Exercise-induced asthma (HCC)      Past Surgical History:   Procedure Laterality Date    Skin surgery  6/11/2015      Family History   Problem Relation Age of Onset    Hypertension Father     Lipids Father     Cancer Maternal Grandfather         CLL    Diabetes Maternal Grandfather         Type 2, never on insulin    Heart Disorder Maternal Grandmother         A fib, heart failure    Lipids Maternal Grandmother     Pulmonary Disease Paternal Grandmother          Copd-chronic smoker      Social History:   Social History     Socioeconomic History    Marital status:    Tobacco Use    Smoking status: Never     Passive exposure: Never    Smokeless tobacco: Never   Vaping Use    Vaping status: Never Used   Substance and Sexual Activity    Alcohol use: Not Currently     Alcohol/week: 1.0 - 2.0 standard drink of alcohol     Types: 1 - 2 Cans of beer per week     Comment: Rarely    Drug use: Never    Sexual activity: Yes     Partners: Male   Other Topics Concern    Caffeine Concern Yes     Comment: 1 coffee/day    Stress Concern Yes    Weight Concern Yes     Comment: Trouble losing weight    Special Diet Yes     Comment: Mostly vegan    Exercise Yes     Comment: some 2-3x/week    Seat Belt Yes    Self-Exams Yes        REVIEW OF SYSTEMS:   GENERAL: feels well otherwise  SKIN: denies any unusual skin lesions  EYES:denies blurred vision or double vision  HEENT: denies nasal congestion, sinus pain or ST  LUNGS: denies shortness of breath with exertion, denies cough  CARDIOVASCULAR: denies chest pain on exertion or at rest, denies palpitations  GI: denies abdominal pain,denies heartburn, denies n/v/d/c/blood in stool  : denies dysuria, vaginal discharge or itching,periods regular   MUSCULOSKELETAL: denies back pain  NEURO: denies headaches, denies LH/dizziness/syncope  PSYCHE: denies depression or anxiety  HEMATOLOGIC: denies hx of anemia  ENDOCRINE: denies thyroid history  ALL/ASTHMA: + hx of allergy    EXAM:   /78   Pulse 81   Temp 97.3 °F (36.3 °C) (Temporal)   Resp 16   Ht 5' 6.93\" (1.7 m)   Wt 234 lb 3.5 oz (106.2 kg)   LMP 10/22/2024 (Exact Date)   BMI 36.76 kg/m²   Body mass index is 36.76 kg/m².   GENERAL: well developed, well nourished,in no apparent distress  SKIN: no rash  HEENT: atraumatic, normocephalic,ears and throat are clear  EYES:PERRLA, EOMI,conjunctiva are clear  NECK: supple,no adenopathy,no thyromegaly  BREAST: deferred to gynecology  LUNGS:  clear to auscultation; no rhonchi, rales, or wheezing  CARDIO: RRR without murmur  GI: good BS's, no masses, HSM or tenderness  : deferred to gynecology  MUSCULOSKELETAL: FROM of the back  EXTREMITIES: no cyanosis, clubbing or edema  NEURO: Oriented times three,cranial nerves are intact,motor and sensory are grossly intact; 2+ knee reflexes bilaterally  VASCULAR: 2+ posterior tibial pulses bilaterally    ASSESSMENT AND PLAN:   Marjorie Lundy is a 29 year old female who presents for a complete physical exam.     1. Wellness examination  - Pap and pelvic deferred to gynecology. Last pap: 03/23/2022.   - Self breast exam discussed.   - BP: at goal  - Pt' s weight is Body mass index is 36.76 kg/m²., at goal, recommended low fat diet and aerobic exercise 30 minutes three times weekly.    - CBC With Differential With Platelet; Future  - Comp Metabolic Panel (14); Future  - TSH W Reflex To Free T4; Future  - Lipid Panel; Future  - Urinalysis with Culture Reflex; Future    2. Generalized anxiety disorder  3. Recurrent major depressive disorder, in full remission (HCC)  Stable, continue current medication. Follow up in 6mo to reassess, sooner if needed.   - escitalopram 5 MG Oral Tab; Take 1 tablet (5 mg total) by mouth daily.  Dispense: 90 tablet; Refill: 1        The patient indicates understanding of these issues and agrees to the plan.      Health maintenance, will check:   Orders Placed This Encounter   Procedures    CBC With Differential With Platelet    Comp Metabolic Panel (14)    TSH W Reflex To Free T4    Lipid Panel    Urinalysis with Culture Reflex           Encounter Diagnoses   Name Primary?    Wellness examination Yes    Generalized anxiety disorder     Recurrent major depressive disorder, in full remission (HCC)        Meds & Refills for this Visit:  Requested Prescriptions     Signed Prescriptions Disp Refills    escitalopram 5 MG Oral Tab 90 tablet 1     Sig: Take 1 tablet (5 mg total) by mouth daily.        Imaging & Consults:  None      Return in about 6 months (around 5/7/2025) for medication follow up, or sooner if needed.      Alfreda Ivan MD  [facility]  11/07/24         [1]   Allergies  Allergen Reactions    Dairy Products DIARRHEA and NAUSEA AND VOMITING    Lactose NAUSEA AND VOMITING    Gluten Meal RASH     Irritability

## 2024-11-11 DIAGNOSIS — F33.42 RECURRENT MAJOR DEPRESSIVE DISORDER, IN FULL REMISSION (HCC): ICD-10-CM

## 2024-11-11 DIAGNOSIS — F41.1 GENERALIZED ANXIETY DISORDER: ICD-10-CM

## 2024-11-12 RX ORDER — ESCITALOPRAM OXALATE 5 MG/1
5 TABLET ORAL DAILY
Qty: 90 TABLET | Refills: 1 | OUTPATIENT
Start: 2024-11-12

## 2025-03-06 ENCOUNTER — LAB REQUISITION (OUTPATIENT)
Dept: LAB | Facility: HOSPITAL | Age: 30
End: 2025-03-06
Payer: COMMERCIAL

## 2025-03-06 DIAGNOSIS — D48.2 NEOPLASM OF UNCERTAIN BEHAVIOR OF PERIPHERAL NERVES AND AUTONOMIC NERVOUS SYSTEM: ICD-10-CM

## 2025-03-06 PROCEDURE — 88305 TISSUE EXAM BY PATHOLOGIST: CPT | Performed by: PLASTIC SURGERY

## 2025-03-14 PROCEDURE — 88305 TISSUE EXAM BY PATHOLOGIST: CPT | Performed by: STUDENT IN AN ORGANIZED HEALTH CARE EDUCATION/TRAINING PROGRAM

## 2025-03-18 ENCOUNTER — LAB REQUISITION (OUTPATIENT)
Dept: LAB | Facility: HOSPITAL | Age: 30
End: 2025-03-18
Payer: COMMERCIAL

## 2025-03-18 DIAGNOSIS — D44.7 NEOPLASM OF UNCERTAIN BEHAVIOR OF AORTIC BODY AND OTHER PARAGANGLIA (HCC): ICD-10-CM

## 2025-06-30 ENCOUNTER — OFFICE VISIT (OUTPATIENT)
Facility: CLINIC | Age: 30
End: 2025-06-30
Payer: COMMERCIAL

## 2025-06-30 VITALS — DIASTOLIC BLOOD PRESSURE: 82 MMHG | BODY MASS INDEX: 38 KG/M2 | SYSTOLIC BLOOD PRESSURE: 138 MMHG | WEIGHT: 242 LBS

## 2025-06-30 DIAGNOSIS — L73.2 HIDRADENITIS SUPPURATIVA: ICD-10-CM

## 2025-06-30 DIAGNOSIS — N94.6 DYSMENORRHEA: ICD-10-CM

## 2025-06-30 DIAGNOSIS — N92.6 IRREGULAR MENSES: Primary | ICD-10-CM

## 2025-06-30 PROCEDURE — 99203 OFFICE O/P NEW LOW 30 MIN: CPT

## 2025-06-30 NOTE — PROGRESS NOTES
Gynecology Office Visit    The patient was offered a medical chaperone during the visit.    Marjorie Lundy is a 30 year old female  Patient's last menstrual period was 10/22/2024 (exact date). (contraception:  none)     HPI:     Chief Complaint   Patient presents with    Gynecologic Exam     LPS 3/2022. Requests a talk only appointment. Diagnosed with hidradenitis suppurativa at Dermatologist office. Previous ob/gyn started PCOS work up.        Marjorie presents to discuss possible PCOS. Was recently diagnosed with hidradenitis suppurativa through her dermatologist and she was advised to follow up with OBGYN due to the increased risk of PCOS. She reports she started a PCOS workup years ago in  with her previous OBGYN but moved to [place] and did not complete the entire workup. Had some labwork done which she reports was normal, never completed the ultrasound. Reports periods are irregular, coming monthly/never skipping months but the frequency varies drastically. Endorses cystic acne along her jaw line and chin/neck, denies hirsutism or alopecia. States on the heaviest days of her period, she goes through about 4 tampons/day, sometimes saturating through them within an hour. +dime-sized clots. Has noted over the past few years, the cramping during her menses has gotten worse - pain relieved by OTC motrin/tylenol.     Does note the past 2 cycles, she has had spotting for 3-4 days prior to her menses starting which is new for her. Denies cramping or pelvic pain outside of her menses. Declines exam today.    Chart and previous encounters reviewed.  HISTORY:  Past Medical History[1]   Past Surgical History[2]   Family History[3]   Social History: Short Social Hx on File[4]     Medications (Active prior to today's visit):  Current Medications[5]    Allergies:  Allergies[6]    Gyn:  Menarche: 13 years old.  Period Cycle (Days): 28  Period Duration (Days): 5-7  Period Flow: Moderate  Use of Birth Control  (if yes, specify type): None  Hx Prior Abnormal Pap: No  Pap Date: 22  Pap Result Notes: WNL - Per patient.    OB Hx:  OB History    Para Term  AB Living   0 0 0 0 0 0   SAB IAB Ectopic Multiple Live Births   0 0 0 0 0         ROS:     10 point ROS completed and was negative, except for pertinent positive and negatives stated in the HPI.    PHYSICAL EXAM:   /82   Wt 242 lb (109.8 kg)   LMP 10/22/2024 (Exact Date)   BMI 37.98 kg/m²      Wt Readings from Last 6 Encounters:   25 242 lb (109.8 kg)   24 234 lb 3.5 oz (106.2 kg)   24 234 lb (106.1 kg)   24 235 lb 3.2 oz (106.7 kg)   10/26/23 240 lb (108.9 kg)   23 235 lb (106.6 kg)        Gen:  Oriented, in no acute distress  Pelvic: declined     ASSESSMENT/PLAN:     1. Irregular menses  - Anti-Müllerian Hormone (AMH) ([employer]); Future  - Testosterone,Total and Weakly Bound w/ SHBG; Future  - Dehydroepiandrosterone Sulfate; Future  - LH (Luteinizing Hormone); Future  - FSH; Future  - Estradiol; Future  - Prolactin; Future  - TSH W Reflex To Free T4; Future    2. Dysmenorrhea    3. Hidradenitis suppurativa    Plan pelvic ultrasound to assess ovaries and new-onset dysmenorrhea  Lab work ordered    Plan to follow up after ultrasound to discuss lab results    Meds This Visit:  Requested Prescriptions      No prescriptions requested or ordered in this encounter       Imaging & Referrals:  None     Return in about 2 weeks (around 2025) for ultrasound, Office Visit.      АННА Mittal  2025  8:37 AM         This note was created by [employer] voice recognition. Errors in content may be related to improper recognition by the system; efforts to review and correct have been done but errors may still exist. Please contact me with any questions.    Note to patient and family   The  Cent[employer] Cures Act makes medical notes available to patients in the interest of transparency.  However, please be  advised that this is a medical document.  It is intended as [id number] communication.  It is written and medical language may contain abbreviations or verbiage that are technical and unfamiliar.  It may appear blunt or direct.  Medical documents are intended to carry relevant information, facts as evident, and the clinical opinion of the practitioner.           [1]   Past Medical History:   Allergic rhinitis    Anxiety    Depression    Esophageal reflux    Exercise-induced asthma (HCC)   [2]   Past Surgical History:  Procedure Laterality Date    Skin surgery  6/11/2015   [3]   Family History  Problem Relation Age of Onset    Hypertension Father     Lipids Father     Cancer Maternal Grandfather         CLL    Diabetes Maternal Grandfather         Type 2, never on insulin    Heart Disorder Maternal Grandmother         A fib, heart failure    Lipids Maternal Grandmother     Pulmonary Disease Paternal Grandmother         Copd-chronic smoker   [4]   Social History  Socioeconomic History    Marital status:    Tobacco Use    Smoking status: Never     Passive exposure: Never    Smokeless tobacco: Never   Vaping Use    Vaping status: Never Used   Substance and Sexual Activity    Alcohol use: Not Currently     Alcohol/week: 1.0 - 2.0 standard drink of alcohol     Types: 1 - 2 Cans of beer per week     Comment: Rarely    Drug use: Never    Sexual activity: Yes     Partners: Male   Other Topics Concern    Caffeine Concern Yes     Comment: 1 coffee/day    Stress Concern Yes    Weight Concern Yes     Comment: Trouble losing weight    Special Diet Yes     Comment: Mostly vegan    Exercise Yes     Comment: some 2-3x/week    Seat Belt Yes    Self-Exams Yes   [5]   Current Outpatient Medications   Medication Sig Dispense Refill    escitalopram 5 MG Oral Tab Take 1 tablet (5 mg total) by mouth daily. 90 tablet 1    cetirizine (ZYRTEC ALLERGY) 10 MG Oral Tab       lactase (LACTAID) 3000 units Oral Tab Take 1 tablet (3,000  Units total) by mouth.      famotidine (PEPCID) 20 MG Oral Tab Take 1 tablet (20 mg total) by mouth as needed. (Patient not taking: Reported on 6/30/2025)     [6]   Allergies  Allergen Reactions    Dairy Products DIARRHEA and NAUSEA AND VOMITING    Lactose NAUSEA AND VOMITING    Gluten Meal RASH     Irritability

## 2025-07-01 ENCOUNTER — MED REC SCAN ONLY (OUTPATIENT)
Facility: CLINIC | Age: 30
End: 2025-07-01

## 2025-07-03 ENCOUNTER — LAB ENCOUNTER (OUTPATIENT)
Dept: LAB | Age: 30
End: 2025-07-03
Payer: COMMERCIAL

## 2025-07-03 DIAGNOSIS — N92.6 IRREGULAR MENSES: ICD-10-CM

## 2025-07-03 DIAGNOSIS — R10.13 EPIGASTRIC ABDOMINAL PAIN: ICD-10-CM

## 2025-07-03 DIAGNOSIS — R10.12 COLICKY LUQ ABDOMINAL PAIN: ICD-10-CM

## 2025-07-03 DIAGNOSIS — M54.6 ACUTE LEFT-SIDED THORACIC BACK PAIN: ICD-10-CM

## 2025-07-03 DIAGNOSIS — R10.32 LLQ PAIN: ICD-10-CM

## 2025-07-03 DIAGNOSIS — R31.29 OTHER MICROSCOPIC HEMATURIA: ICD-10-CM

## 2025-07-03 DIAGNOSIS — Z00.00 WELLNESS EXAMINATION: ICD-10-CM

## 2025-07-03 DIAGNOSIS — R07.89 LEFT-SIDED CHEST WALL PAIN: ICD-10-CM

## 2025-07-03 DIAGNOSIS — R10.12 LUQ PAIN: ICD-10-CM

## 2025-07-03 DIAGNOSIS — R10.2 SUPRAPUBIC PAIN: ICD-10-CM

## 2025-07-03 LAB
ALBUMIN SERPL-MCNC: 5.1 G/DL (ref 3.2–4.8)
ALBUMIN/GLOB SERPL: 1.8 {RATIO} (ref 1–2)
ALP LIVER SERPL-CCNC: 77 U/L (ref 37–98)
ALT SERPL-CCNC: 21 U/L (ref 10–49)
ANION GAP SERPL CALC-SCNC: 11 MMOL/L (ref 0–18)
AST SERPL-CCNC: 17 U/L (ref ?–34)
B-HCG SERPL-ACNC: <2.6 MIU/ML (ref ?–4.2)
BASOPHILS # BLD AUTO: 0.03 X10(3) UL (ref 0–0.2)
BASOPHILS NFR BLD AUTO: 0.4 %
BILIRUB SERPL-MCNC: 0.6 MG/DL (ref 0.3–1.2)
BILIRUB UR QL STRIP.AUTO: NEGATIVE
BUN BLD-MCNC: 10 MG/DL (ref 9–23)
CALCIUM BLD-MCNC: 10.3 MG/DL (ref 8.7–10.6)
CHLORIDE SERPL-SCNC: 104 MMOL/L (ref 98–112)
CHOLEST SERPL-MCNC: 267 MG/DL (ref ?–200)
CLARITY UR REFRACT.AUTO: CLEAR
CO2 SERPL-SCNC: 24 MMOL/L (ref 21–32)
COLOR UR AUTO: COLORLESS
CREAT BLD-MCNC: 0.8 MG/DL (ref 0.55–1.02)
DHEA-S SERPL-MCNC: 445.5 UG/DL (ref 25.9–460.2)
EGFRCR SERPLBLD CKD-EPI 2021: 102 ML/MIN/1.73M2 (ref 60–?)
EOSINOPHIL # BLD AUTO: 0.04 X10(3) UL (ref 0–0.7)
EOSINOPHIL NFR BLD AUTO: 0.5 %
ERYTHROCYTE [DISTWIDTH] IN BLOOD BY AUTOMATED COUNT: 12.3 %
ESTRADIOL SERPL-MCNC: 62.9 PG/ML
FASTING PATIENT LIPID ANSWER: YES
FASTING STATUS PATIENT QL REPORTED: YES
FSH SERPL-ACNC: 9.6 MIU/ML
GLOBULIN PLAS-MCNC: 2.8 G/DL (ref 2–3.5)
GLUCOSE BLD-MCNC: 88 MG/DL (ref 70–99)
GLUCOSE UR STRIP.AUTO-MCNC: NORMAL MG/DL
HCT VFR BLD AUTO: 39.6 % (ref 35–48)
HDLC SERPL-MCNC: 40 MG/DL (ref 40–59)
HGB BLD-MCNC: 13.8 G/DL (ref 12–16)
IMM GRANULOCYTES # BLD AUTO: 0.02 X10(3) UL (ref 0–1)
IMM GRANULOCYTES NFR BLD: 0.2 %
KETONES UR STRIP.AUTO-MCNC: NEGATIVE MG/DL
LDLC SERPL CALC-MCNC: 194 MG/DL (ref ?–100)
LEUKOCYTE ESTERASE UR QL STRIP.AUTO: NEGATIVE
LH SERPL-ACNC: 6.7 MIU/ML
LYMPHOCYTES # BLD AUTO: 2.54 X10(3) UL (ref 1–4)
LYMPHOCYTES NFR BLD AUTO: 30.3 %
MCH RBC QN AUTO: 30.9 PG (ref 26–34)
MCHC RBC AUTO-ENTMCNC: 34.8 G/DL (ref 31–37)
MCV RBC AUTO: 88.8 FL (ref 80–100)
MONOCYTES # BLD AUTO: 0.51 X10(3) UL (ref 0.1–1)
MONOCYTES NFR BLD AUTO: 6.1 %
NEUTROPHILS # BLD AUTO: 5.25 X10 (3) UL (ref 1.5–7.7)
NEUTROPHILS # BLD AUTO: 5.25 X10(3) UL (ref 1.5–7.7)
NEUTROPHILS NFR BLD AUTO: 62.5 %
NITRITE UR QL STRIP.AUTO: NEGATIVE
NONHDLC SERPL-MCNC: 227 MG/DL (ref ?–130)
OSMOLALITY SERPL CALC.SUM OF ELEC: 286 MOSM/KG (ref 275–295)
PH UR STRIP.AUTO: 6.5 [PH] (ref 5–8)
PLATELET # BLD AUTO: 412 10(3)UL (ref 150–450)
POTASSIUM SERPL-SCNC: 3.8 MMOL/L (ref 3.5–5.1)
PROLACTIN SERPL-MCNC: 8.9 NG/ML
PROT SERPL-MCNC: 7.9 G/DL (ref 5.7–8.2)
PROT UR STRIP.AUTO-MCNC: NEGATIVE MG/DL
RBC # BLD AUTO: 4.46 X10(6)UL (ref 3.8–5.3)
RBC UR QL AUTO: NEGATIVE
SODIUM SERPL-SCNC: 139 MMOL/L (ref 136–145)
SP GR UR STRIP.AUTO: <1.005 (ref 1–1.03)
TRIGL SERPL-MCNC: 177 MG/DL (ref 30–149)
TSI SER-ACNC: 0.87 UIU/ML (ref 0.55–4.78)
UROBILINOGEN UR STRIP.AUTO-MCNC: NORMAL MG/DL
VLDLC SERPL CALC-MCNC: 38 MG/DL (ref 0–30)
WBC # BLD AUTO: 8.4 X10(3) UL (ref 4–11)

## 2025-07-03 PROCEDURE — 80053 COMPREHEN METABOLIC PANEL: CPT

## 2025-07-03 PROCEDURE — 83520 IMMUNOASSAY QUANT NOS NONAB: CPT

## 2025-07-03 PROCEDURE — 81003 URINALYSIS AUTO W/O SCOPE: CPT

## 2025-07-03 PROCEDURE — 82670 ASSAY OF TOTAL ESTRADIOL: CPT

## 2025-07-03 PROCEDURE — 82627 DEHYDROEPIANDROSTERONE: CPT

## 2025-07-03 PROCEDURE — 84702 CHORIONIC GONADOTROPIN TEST: CPT

## 2025-07-03 PROCEDURE — 83001 ASSAY OF GONADOTROPIN (FSH): CPT

## 2025-07-03 PROCEDURE — 84443 ASSAY THYROID STIM HORMONE: CPT

## 2025-07-03 PROCEDURE — 36415 COLL VENOUS BLD VENIPUNCTURE: CPT

## 2025-07-03 PROCEDURE — 84410 TESTOSTERONE BIOAVAILABLE: CPT

## 2025-07-03 PROCEDURE — 85025 COMPLETE CBC W/AUTO DIFF WBC: CPT

## 2025-07-03 PROCEDURE — 84146 ASSAY OF PROLACTIN: CPT

## 2025-07-03 PROCEDURE — 83002 ASSAY OF GONADOTROPIN (LH): CPT

## 2025-07-03 PROCEDURE — 80061 LIPID PANEL: CPT

## 2025-07-04 DIAGNOSIS — F33.42 RECURRENT MAJOR DEPRESSIVE DISORDER, IN FULL REMISSION: ICD-10-CM

## 2025-07-04 DIAGNOSIS — F41.1 GENERALIZED ANXIETY DISORDER: ICD-10-CM

## 2025-07-07 ENCOUNTER — OFFICE VISIT (OUTPATIENT)
Dept: INTERNAL MEDICINE CLINIC | Facility: CLINIC | Age: 30
End: 2025-07-07
Payer: COMMERCIAL

## 2025-07-07 VITALS
DIASTOLIC BLOOD PRESSURE: 70 MMHG | SYSTOLIC BLOOD PRESSURE: 122 MMHG | TEMPERATURE: 98 F | OXYGEN SATURATION: 98 % | HEIGHT: 67 IN | HEART RATE: 80 BPM | WEIGHT: 234 LBS | RESPIRATION RATE: 20 BRPM | BODY MASS INDEX: 36.73 KG/M2

## 2025-07-07 DIAGNOSIS — E04.1 RIGHT THYROID NODULE: Primary | ICD-10-CM

## 2025-07-07 DIAGNOSIS — M54.2 NECK PAIN ON RIGHT SIDE: ICD-10-CM

## 2025-07-07 RX ORDER — MUPIROCIN 2 %
OINTMENT (GRAM) TOPICAL
COMMUNITY
Start: 2025-02-06

## 2025-07-07 RX ORDER — ESCITALOPRAM OXALATE 5 MG/1
5 TABLET ORAL DAILY
Qty: 90 TABLET | Refills: 0 | Status: SHIPPED | OUTPATIENT
Start: 2025-07-07

## 2025-07-07 RX ORDER — DOXYCYCLINE 100 MG/1
CAPSULE ORAL
COMMUNITY
Start: 2025-06-19

## 2025-07-07 RX ORDER — CALCIUM CARBONATE/VITAMIN D3 600 MG-10
TABLET ORAL
COMMUNITY
Start: 2025-07-04

## 2025-07-07 NOTE — PROGRESS NOTES
CHIEF COMPLAINT:   Chief Complaint   Patient presents with    Thyroid Problem     Thyroid issue noticed around x 1 month  swelling mild discomfort when swallowing  and pain to the area around        HPI:   Marjorie Lundy is a 30 year old female who presents for neck discomfort.     Wt Readings from Last 6 Encounters:   07/07/25 234 lb (106.1 kg)   06/30/25 242 lb (109.8 kg)   11/07/24 234 lb 3.5 oz (106.2 kg)   09/09/24 234 lb (106.1 kg)   05/23/24 235 lb 3.2 oz (106.7 kg)   10/26/23 240 lb (108.9 kg)     Body mass index is 36.65 kg/m².     History of Present Illness  Marjorie Lundy is a 30 year old female who presents with neck pain and swelling.    She has been experiencing neck pain and swelling since June 19th, initially thought to be muscle strain, but the pain has progressively worsened. She experiences irritation when swallowing but does not have dysphagia. No cough, increased acid reflux, heartburn, nasal drainage, or nocturnal coughing. No recent travel outside the [place].    There is a family history of thyroid disorders, with her sister and grandmother having hyperthyroidism. Her grandmother has been hospitalized for thyroid storms. A TSH test on July 3rd was normal but on the lower end. A thyroid ultrasound in October 2023 showed no discernible thyroid nodules but tiny colloid cysts on the right thyroid lobe.    She has a history of hidradenitis suppurativa and is currently on doxycycline for a flare-up. She has been on doxycycline two other times this year for a cyst on her arm that was surgically removed. She started the current dose of doxycycline a week after noticing the neck pain. She takes famotidine as needed and has not taken it recently.    In the review of symptoms, she denies respiratory symptoms such as shortness of breath, cough, congestion, fevers, night sweats, or weight loss. She reports being 'always hot' but does not experience night sweats. No lymphadenopathy in the arms  or groin. She has seasonal allergies, which have been slightly active recently, but she has not needed to use Flonase.       Current Medications[1]   Past Medical History[2]   Past Surgical History[3]   Family History[4]   Social History:   Short Social Hx on File[5]     REVIEW OF SYSTEMS:   Negative except for what is mentioned in HPI.     Screenings:   1.    2.    3.    4.    5.    6.    7.    8.    9.               EXAM:   /70 (BP Location: Right arm, Patient Position: Sitting, Cuff Size: adult)   Pulse 80   Temp 98 °F (36.7 °C) (Temporal)   Resp 20   Ht 5' 7\" (1.702 m)   Wt 234 lb (106.1 kg)   LMP 06/20/2025 (Exact Date)   SpO2 98%   BMI 36.65 kg/m²   Body mass index is 36.65 kg/m².   GENERAL: well developed, well nourished,in no apparent distress  HEENT: Throat is clear - no cobblestoning, erythema or edema noted.   EYES: conjunctiva are clear    Physical Exam  NECK: R anterior portion of the neck, do note small 1cm nodule, soft mobile. TM on the R side nonbulging, no cerumen in the canal noted. Cerumen impaction on L side. No facial swelling, no parotid gland enlargement. No palpable masses in the neck. No visible gum swelling or bleeding noted.     Labs:   Lab Results   Component Value Date/Time    WBC 8.4 07/03/2025 01:52 PM    HGB 13.8 07/03/2025 01:52 PM    .0 07/03/2025 01:52 PM      Lab Results   Component Value Date/Time    GLU 88 07/03/2025 01:52 PM     07/03/2025 01:52 PM    K 3.8 07/03/2025 01:52 PM     07/03/2025 01:52 PM    CO2 24.0 07/03/2025 01:52 PM    CREATSERUM 0.80 07/03/2025 01:52 PM    CA 10.3 07/03/2025 01:52 PM    ALB 5.1 (H) 07/03/2025 01:52 PM    TP 7.9 07/03/2025 01:52 PM    ALKPHO 77 07/03/2025 01:52 PM    AST 17 07/03/2025 01:52 PM    ALT 21 07/03/2025 01:52 PM    BILT 0.6 07/03/2025 01:52 PM    TSH 0.872 07/03/2025 01:52 PM    T4F 1.1 05/16/2024 01:08 PM        Lab Results   Component Value Date/Time    CHOLEST 267 (H) 07/03/2025 01:52 PM    HDL 40  07/03/2025 01:52 PM    TRIG 177 (H) 07/03/2025 01:52 PM     (H) 07/03/2025 01:52 PM    NONHDLC 227 (H) 07/03/2025 01:52 PM       No results found for: \"A1C\"   No results found for: \"VITD\"      Imaging:   No results found.  Assessment & Plan  //Neck pain with possible thyroid involvement  Neck pain and swelling since June 19, 2025, with worsening symptoms. No dysphagia, but irritation on swallowing. Pain on the R side has progressively gotten worse. Family history of hyperthyroidism. Recent TSH levels 7/3/2025 wnls. Previous thyroid ultrasound in October 2023 showed no nodules but tiny colloid cysts on the right thyroid lobe. Possible silent GERD or inflammation due to doxycycline use. Differential diagnosis includes thyroid-related issues or silent GERD.  - Order thyroid ultrasound  - Start famotidine 20 mg once daily to assess for silent GERD  - Advise ibuprofen 200-400 mg daily for inflammation, with the option to increase to 600 mg if pain is significant  - Consider a pack of steroids if pain worsens    //Hidradenitis suppurativa  Currently on doxycycline for a flare-up. Has been on doxycycline two other times this year for a cyst on her arm that was surgically removed.    General Health Maintenance  Denies recent travel, respiratory symptoms, weight loss, or lymphadenopathy. Seasonal allergies present but not severe enough to require Flonase.    Recording duration: 8 minutes    Return if symptoms worsen or fail to improve.    Cherrie Forrest MD   Internal Medicine          The following individual(s) verbally consented to be recorded using ambient AI listening technology and understand that they can each withdraw their consent to this listening technology at any point by asking the clinician to turn off or pause the recording:    Patient name: Marjorie Lundy             [1]   Current Outpatient Medications   Medication Sig Dispense Refill    escitalopram 5 MG Oral Tab Take 1 tablet (5 mg total) by mouth  daily. 90 tablet 0    Omega 3 1200 MG Oral Cap       mupirocin 2 % External Ointment       doxycycline 100 MG Oral Cap       cetirizine (ZYRTEC ALLERGY) 10 MG Oral Tab       lactase (LACTAID) 3000 units Oral Tab Take 1 tablet (3,000 Units total) by mouth.      famotidine (PEPCID) 20 MG Oral Tab Take 1 tablet (20 mg total) by mouth as needed.      Red Yeast Rice Extract (RED YEAST RICE OR)      [2]   Past Medical History:   Allergic rhinitis    Anxiety    Depression    Esophageal reflux    Exercise-induced asthma (HCC)   [3]   Past Surgical History:  Procedure Laterality Date    Skin surgery  6/11/2015   [4]   Family History  Problem Relation Age of Onset    Hypertension Father     Lipids Father     Cancer Maternal Grandfather         CLL    Diabetes Maternal Grandfather         Type 2, never on insulin    Heart Disorder Maternal Grandmother         A fib, heart failure    Lipids Maternal Grandmother     Pulmonary Disease Paternal Grandmother         Copd-chronic smoker   [5]   Social History  Socioeconomic History    Marital status:    Tobacco Use    Smoking status: Never     Passive exposure: Never    Smokeless tobacco: Never   Vaping Use    Vaping status: Never Used   Substance and Sexual Activity    Alcohol use: Not Currently     Alcohol/week: 1.0 - 2.0 standard drink of alcohol     Types: 1 - 2 Cans of beer per week     Comment: Rarely    Drug use: Never    Sexual activity: Yes     Partners: Male   Other Topics Concern    Caffeine Concern Yes     Comment: 1 coffee/day    Stress Concern Yes    Weight Concern Yes     Comment: Trouble losing weight    Special Diet Yes     Comment: Mostly vegan    Exercise Yes     Comment: some 2-3x/week    Seat Belt Yes    Self-Exams Yes

## 2025-07-07 NOTE — TELEPHONE ENCOUNTER
Requested Prescriptions     Pending Prescriptions Disp Refills    ESCITALOPRAM 5 MG Oral Tab [Pharmacy Med Name: ESCITALOPRAM 5 MG TABLET] 90 tablet 1     Sig: TAKE 1 TABLET (5 MG TOTAL) BY MOUTH DAILY.     Last refill 11/7/24 #90 x 1  Last office visit 11/7/24  Future Appointments   Date Time Provider Department Center                 11/13/2025  2:00 PM Alfreda Ivan MD [id number]              Psychiatric Non-Scheduled (Anti-Anxiety) [id number]/04/2025 12:26 AM   Protocol Details In person appointment or virtual visit in the past 6 mos or appointment in next 3 mos    Depression Screening completed within the past 12 months    Medication is active on med list

## 2025-07-07 NOTE — PATIENT INSTRUCTIONS
VISIT SUMMARY:  During your visit, we discussed your ongoing neck pain and swelling, which has been worsening since June 19th. We also reviewed your history of hidradenitis suppurativa and your current treatment with doxycycline. Additionally, we covered general health maintenance topics, including your seasonal allergies and lack of recent travel or significant respiratory symptoms.    YOUR PLAN:  -NECK PAIN WITH POSSIBLE THYROID INVOLVEMENT: Your neck pain and swelling may be related to your thyroid or could be due to silent GERD (acid reflux without typical symptoms). We will perform another thyroid ultrasound to get more information. In the meantime, start taking famotidine 20 mg once daily to see if it helps with any potential acid reflux. You can also take ibuprofen 200-400 mg daily for inflammation, and you may increase the dose to 600 mg if the pain is significant. If the pain worsens, we might consider a short course of steroids.    -HIDRADENITIS SUPPURATIVA: Hidradenitis suppurativa is a chronic skin condition that causes small, painful lumps under the skin. You are currently on doxycycline for a flare-up, and we will continue with this treatment.    -GENERAL HEALTH MAINTENANCE: You have no recent travel, respiratory symptoms, weight loss, or lymphadenopathy. Your seasonal allergies are present but not severe enough to require Flonase at this time.    INSTRUCTIONS:  Please schedule a thyroid ultrasound as soon as possible. Start taking famotidine 20 mg once daily and ibuprofen 200-400 mg daily for inflammation, increasing to 600 mg if needed. If your pain worsens, contact us to discuss the possibility of starting a short course of steroids.    Contains text generated by Facundo

## 2025-07-09 LAB
MULLERIAN AMH: 5.1 NG/ML
SEX HORM BIND GLOB: 29.5 NMOL/L
TESTOST % FREE+WEAK BND: 25 %
TESTOST FREE+WEAK BND: 10.2 NG/DL
TESTOSTERONE TOT /MS: 40.8 NG/DL

## 2025-07-15 ENCOUNTER — NURSE ONLY (OUTPATIENT)
Facility: CLINIC | Age: 30
End: 2025-07-15
Payer: COMMERCIAL

## 2025-07-15 VITALS
SYSTOLIC BLOOD PRESSURE: 118 MMHG | DIASTOLIC BLOOD PRESSURE: 74 MMHG | BODY MASS INDEX: 36.7 KG/M2 | HEIGHT: 67 IN | WEIGHT: 233.81 LBS

## 2025-07-15 DIAGNOSIS — N92.6 IRREGULAR MENSES: Primary | ICD-10-CM

## 2025-07-15 DIAGNOSIS — L73.2 HIDRADENITIS SUPPURATIVA: ICD-10-CM

## 2025-07-15 DIAGNOSIS — N94.6 DYSMENORRHEA: ICD-10-CM

## 2025-07-15 PROCEDURE — 99213 OFFICE O/P EST LOW 20 MIN: CPT

## 2025-07-15 PROCEDURE — 76830 TRANSVAGINAL US NON-OB: CPT

## 2025-07-15 NOTE — PROGRESS NOTES
Gynecology Office Visit    The patient was offered a medical chaperone during the visit.    Marjorie Lundy is a 30 year old female  Patient's last menstrual period was 2025 (exact date). (contraception:  none)     HPI:     Chief Complaint   Patient presents with    Ultrasound     Irregular menses, Dysmenorrhea, LMP 2025       Marjorie presents to follow up on lab work and ultrasound results for PCOS workup. See note from 25. Currently working with derm for hidradenitis suppurativa, think she may be starting spironolactone soon.    Chart and previous encounters reviewed.  HISTORY:  Past Medical History[1]   Past Surgical History[2]   Family History[3]   Social History: Short Social Hx on File[4]     Medications (Active prior to today's visit):  Current Medications[5]    Allergies:  Allergies[6]    Gyn:  Menarche: 13 years old.  Period Cycle (Days): 28  Period Duration (Days): 5-8  Period Flow: Moderate  Use of Birth Control (if yes, specify type): None  Hx Prior Abnormal Pap: No  Pap Date: 22  Pap Result Notes: WNL - Per patient.  Follow Up Recommendation: due    OB Hx:  OB History    Para Term  AB Living   0 0 0 0 0 0   SAB IAB Ectopic Multiple Live Births   0 0 0 0 0         ROS:     10 point ROS completed and was negative, except for pertinent positive and negatives stated in the HPI.    PHYSICAL EXAM:   /74   Ht 67\"   Wt 233 lb 12.8 oz (106.1 kg)   LMP 2025 (Exact Date)   BMI 36.62 kg/m²      Wt Readings from Last 6 Encounters:   07/15/25 233 lb 12.8 oz (106.1 kg)   25 234 lb (106.1 kg)   25 242 lb (109.8 kg)   24 234 lb 3.5 oz (106.2 kg)   24 234 lb (106.1 kg)   24 235 lb 3.2 oz (106.7 kg)        Gen:  Oriented, in no acute distress    Anti-Müllerian Hormone (AMH) ([facility])  Order: [id number]   Collected 7/3/2025  1:52 PM       Status: Final result       Dx: Irregular menses    2 Result Notes       1 Patient  Communication       View Follow-Up Encounter      Component  Ref Range & Units (hover) 7/3/25  1:52 PM   Mullerian AMH 5.10   Comment: For assays employing antibodies, the possibility exists for  interference by heterophile antibodies in the samples.1  1.Ximena LEE  Interferences in Immunoassays - still a threat.   Clin. Chem. 2000; 46: 0107-1712.  This test was developed and its performance characteristics  determined by [facility]. It has not been cleared or approved  by the Food and Drug Administration.  Reference Range:  Females 26 - 30y: 1.03 - 11.10  Median 4.20  AMH concentrations of >= 1.06 ng/mL is correlated with a  better response to ovarian stimulation, produced more  retrievable oocytes and higher odds of live birth according  to Dana et al.  Fertility and Sterility. 2010:  94:7870-5628.  The current AMH test method correlates with  the study method with a slope of 0.94.  Females at risk of ovarian hyperstimulation syndrome or  polycystic ovarian syndrome (PCOS) may exhibit elevated  serum AMH concentrations.   AMH levels from PCOS patients  may be 2 to 5 fold higher than age-appropriate reference  interval values.  Granulosa cell tumors of the ovary may secrete AMH along  with other tumor markers.  Elevated AMH is not specific for  malignancy, and the assay should not be used exclusively to  diagnose or exclude an AMH-secreting ovarian tumor.   Resulting Agency [facility]              Narrative  Performed by: [facility]  Performed at:   - [facility]  [address] [place] [address]  [id number]  : Antoine Jarrell MD, Phone:  [phone]   Specimen Collected: 07/03/25  1:52 PM Last Resulted: 07/09/25  1:06 AM     Testosterone,Total and Weakly Bound w/ SHBG  Order: [id number]   Collected 7/3/2025  1:52 PM       Status: Final result       Dx: Irregular menses    1 Result Note       1 Patient Communication       View Follow-Up Encounter      Component  Ref Range & Units (hover) 7/3/25  1:52  PM   Testosterone Tot LC/MS 40.8   Testost % Free+Weak Bnd 25.0 High    Comment: This test was developed and its performance characteristics  determined by [facility]. It has not been cleared or approved  by the Food and Drug Administration.   Testost Free+Weak Bnd 10.2 High    Sex Horm Bind Glob 29.5   [facility]              Narrative  Performed by: [facility]  Test(s) 070036-Testosterone, Total, LC/MS  was developed and its performance characteristics determined  by [facility]. It has not been cleared or approved by the Food  and Drug Administration.  Performed at:  01 - [facility]  [id number]  : Criss Corona MD, Phone:  [phone]  Performed at:  02 - [facility]  [id number]  : Justin Fields PhD, Phone:  [phone]   Specimen Collected: 07/03/25  1:52 PM Last Resulted: 07/09/25  2:07 PM       LH (Luteinizing Hormone)  Order: [id number]   Collected 7/3/2025  1:52 PM       Status: Final result       Dx: Irregular menses    2 Result Notes       1 Patient Communication       View Follow-Up Encounter      Component  Ref Range & Units (hover) 7/3/25  1:52 PM   LH 6.7   Comment: Follicular phase:  1.9 - 12.5 mIU/ml  Midcycle:          8.7 - 76.3 mIU/ml  Luteal Phase:      0.5 - 16.9 mIU/ml  Postmenopausal:    5.0 - 55.2 mIU/ml  Pregnant:          < 0.1 - 1.5 mIU/ml   Resulting [place] [facility])             Specimen Collected: 07/03/25  1:52 PM Last Resulted: 07/03/25  5:56 PM   F[facility]  Order: [id number]   Collected 7/3/2025  1:52 PM       Status: Final result       Dx: Irregular menses    2 Result Notes       1 Patient Communication       View Follow-Up Encounter      Component  Ref Range & Units (hover) 7/3/25  1:52 PM   FSH 9.6   Resulting [place] [facility])              Narrative  Performed by: Paulino Swan ([facility])  Follicular phase:  2.5 - 10.2 mIU/ml  Midcycle:          3.4 - 33.4 mIU/ml  Luteal Phase:       1.5 - 9.1 mIU/ml  Postmenopausal:    23.0 - 116.3 mIU/ml  Pregnant:          < 0.3 mIU/ml   Specimen Collected: 07/03/25  1:52 PM Last Resulted: 07/03/25  5:56 PM         ASSESSMENT/PLAN:     1. Irregular menses  - US, Vaginal[80771]; Future    2. Dysmenorrhea  - US, Vaginal[69047]; Future    3. Hidradenitis suppurativa    Total testosterone and SHBG normal  Periods mostly regular with exception of 1 month  Ultrasound done in office today unremarkable, no PCO appearance of ovaries    Reviewed labwork finding and ultrasound with patient, not diagnostic for PCOS  Counseled on menstrual irregularities possible when starting spironolactone, offered OCPs to help with period regulation, pt declines at this time    Meds This Visit:  Requested Prescriptions      No prescriptions requested or ordered in this encounter       Imaging & Referrals:  US TRANSVAGINAL EMG ONLY     Return in about 3 months (around 10/15/2025) for Well Woman Exam.      АННА Mittal  7/15/2025  10:33 AM         This note was created by Dragon voice recognition. Errors in content may be related to improper recognition by the system; efforts to review and correct have been done but errors may still exist. Please contact me with any questions.    Note to patient and family   The 21st Century Cures Act makes medical notes available to patients in the interest of transparency.  However, please be advised that this is a medical document.  It is intended as nlvl-sm-zvoj communication.  It is written and medical language may contain abbreviations or verbiage that are technical and unfamiliar.  It may appear blunt or direct.  Medical documents are intended to carry relevant information, facts as evident, and the clinical opinion of the practitioner.           [1]   Past Medical History:   Allergic rhinitis    Anxiety    Depression    Esophageal reflux    Exercise-induced asthma (HCC)    Hidradenitis suppurativa   [2]   Past Surgical  History:  Procedure Laterality Date    Skin surgery  6/11/2015   [3]   Family History  Problem Relation Age of Onset    Hypertension Father     Lipids Father     Cancer Maternal Grandfather         CLL    Diabetes Maternal Grandfather         Type 2, never on insulin    Heart Disorder Maternal Grandmother         A fib, heart failure    Lipids Maternal Grandmother     Hypertension Maternal Grandmother     Pulmonary Disease Paternal Grandmother         Copd-chronic smoker   [4]   Social History  Socioeconomic History    Marital status:    Tobacco Use    Smoking status: Never     Passive exposure: Never    Smokeless tobacco: Never   Vaping Use    Vaping status: Never Used   Substance and Sexual Activity    Alcohol use: Not Currently     Alcohol/week: 1.0 - 2.0 standard drink of alcohol     Types: 1 - 2 Cans of beer per week     Comment: Rarely    Drug use: Never    Sexual activity: Yes     Partners: Male   Other Topics Concern    Caffeine Concern Yes     Comment: 1 coffee/day    Stress Concern Yes    Weight Concern Yes     Comment: Trouble losing weight    Special Diet Yes     Comment: Mostly vegan    Exercise Yes     Comment: some 2-3x/week    Seat Belt Yes    Self-Exams Yes     Social Drivers of Health     Food Insecurity: No Food Insecurity (7/15/2025)    NCSS - Food Insecurity     Worried About Running Out of Food in the Last Year: No     Ran Out of Food in the Last Year: No   Transportation Needs: No Transportation Needs (7/15/2025)    NCSS - Transportation     Lack of Transportation: No   Housing Stability: Not At Risk (7/15/2025)    NCSS - Housing/Utilities     Has Housing: Yes     Worried About Losing Housing: No     Unable to Get Utilities: No   [5]   Current Outpatient Medications   Medication Sig Dispense Refill    escitalopram 5 MG Oral Tab Take 1 tablet (5 mg total) by mouth daily. 90 tablet 0    Red Yeast Rice Extract (RED YEAST RICE OR)       [place] 3 1200 MG Oral Cap       mupirocin 2 % External  Ointment       cetirizine (ZYRTEC ALLERGY) 10 MG Oral Tab       lactase (LACTAID) 3000 units Oral Tab Take 1 tablet (3,000 Units total) by mouth.      famotidine (PEPCID) 20 MG Oral Tab Take 1 tablet (20 mg total) by mouth as needed.      doxycycline 100 MG Oral Cap      [6]   Allergies  Allergen Reactions    Dairy Products DIARRHEA and NAUSEA AND VOMITING    Lactose NAUSEA AND VOMITING    Gluten Meal RASH     Irritability

## 2025-07-17 ENCOUNTER — OFFICE VISIT (OUTPATIENT)
Dept: FAMILY MEDICINE CLINIC | Facility: CLINIC | Age: 30
End: 2025-07-17
Payer: COMMERCIAL

## 2025-07-17 VITALS
SYSTOLIC BLOOD PRESSURE: 124 MMHG | TEMPERATURE: 97 F | WEIGHT: 231 LBS | HEIGHT: 67 IN | BODY MASS INDEX: 36.26 KG/M2 | DIASTOLIC BLOOD PRESSURE: 82 MMHG | HEART RATE: 83 BPM | RESPIRATION RATE: 18 BRPM

## 2025-07-17 DIAGNOSIS — E78.00 HYPERCHOLESTEROLEMIA: Primary | ICD-10-CM

## 2025-07-17 PROCEDURE — 99214 OFFICE O/P EST MOD 30 MIN: CPT | Performed by: FAMILY MEDICINE

## 2025-07-17 RX ORDER — ATORVASTATIN CALCIUM 40 MG/1
40 TABLET, FILM COATED ORAL NIGHTLY
Qty: 90 TABLET | Refills: 0 | Status: SHIPPED | OUTPATIENT
Start: 2025-07-17

## 2025-07-17 RX ORDER — CLINDAMYCIN PHOSPHATE 10 UG/ML
LOTION TOPICAL 2 TIMES DAILY
COMMUNITY
Start: 2025-06-19

## 2025-07-18 ENCOUNTER — TELEPHONE (OUTPATIENT)
Dept: INTERNAL MEDICINE CLINIC | Facility: CLINIC | Age: 30
End: 2025-07-18

## 2025-07-18 NOTE — TELEPHONE ENCOUNTER
Reviewed. Recommend repeat ultrasound in 6 months to ensure stability. 10/2023 there were notable tiny colloidal cysts which were not quantified in size on the report.

## 2025-07-18 NOTE — PROGRESS NOTES
[facility] Family Medicine Office Note  Chief Complaint:   Chief Complaint   Patient presents with    Lab Results     Labs completed on 07/069/2025       HPI:   This is a 30 year old female coming in for lab follow-up.  The patient denies any acute concerns denies any chest pain nausea vomiting diarrhea constipation.  States that she has been doing well with her Lexapro.  She does have a history of anxiety      Past Medical History[1]  Past Surgical History[2]  Social History:  Short Social Hx on File[3]  Family History:  Family History[4]  Allergies:  Allergies[5]  Current Meds:  Current Medications[6]   Counseling given: Not Answered       REVIEW OF SYSTEMS:   Consitutional: No fevers, chills, sweats  Eye: No recent visual problems  ENMT: No ear pain nasal congestion sore throat  Respiratory: No shortness of breath, cough  Cardiovascular: No chest pain palpitations syncope  Gastrointestinal: No nausea vomiting diarrhea  Genitourinary: No hematuria  Psychiatric: No anxiety, depression    Medical, surgical, family, and social histories were reviewed      EXAM:   VITALS:   Vitals:    07/17/25 1214   BP: 124/82   Pulse: 83   Resp: 18   Temp: 97.1 °F (36.2 °C)      GENERAL: well developed, well nourished, in no apparent distress  SKIN: no rashes, no suspicious lesions: Cool and Dry  HEENT: atraumatic, normocephalic, ears and throat are clear    Ears: TM's clear and visible bilaterally, no excess cerumen or erythema.   EYES: Pupils equal round and reactive.  Extraocular motions intact no scleral icterus no injection or drainage  THROAT without erythema tonsillar hypertrophy or exudate.  Uvula midline airway patent  NECK: Given midline.  No JVD or lymphadenopathy supple nontender no meningeal signs   LUNGS: clear to auscultation sounds equal bilaterally no wheezes rales or rhonchi  CARDIO: Regular rate and rhythm without murmurs gallops or rubs  GI: Soft nontender nondistended no hepatomegaly palpable masses.   No guarding.   without deformity or crepitus no flank tenderness  EXTREMITIES: no cyanosis, clubbing or edema no joint tenderness effusion or edema noted.  No calf tenderness negative Homans' sign bilaterally  NEURO: Awake and alert.  Cranial nerves II through XII intact motor and sensory grossly within normal limits.  5 out of 5 muscle strength in all muscle groups.  Normal speech.  PSYCHIATRIC: Awake, alert and oriented x3, cooperative appropriate mood and affect.  Judgment intact       ASSESSMENT AND PLAN:   1. Hypercholesterolemia  - atorvastatin 40 MG Oral Tab; Take 1 tablet (40 mg total) by mouth nightly.  Dispense: 90 tablet; Refill: 0  - Comp Metabolic Panel (14); Future  - Lipid Panel; Future  - [facility] CALCIUM SCORING; Future  Did discuss with the patient her LDL is extremely elevated I would suggest for her to have a heart scan completed.  She does have an extensive family history of heart disease.  Was started on atorvastatin 40 mg once a day.  She was asked to watch her fatty food fried food intake and to repeat her blood work in the next 3 months she will need a CMP lipid panel    Meds & Refills for this Visit:  Requested Prescriptions     Signed Prescriptions Disp Refills    atorvastatin 40 MG Oral Tab 90 tablet 0     Sig: Take 1 tablet (40 mg total) by mouth nightly.       Health Maintenance:  Health Maintenance Due   Topic Date Due    COVID-19 Vaccine (2 - 2024-25 season) 09/01/2024    Pap Smear  03/23/2025       Patient/Caregiver Education: Patient/Caregiver Education: There are no barriers to learning. Medical education done.   Outcome: Patient verbalizes understanding. Patient is notified to call with any questions, complications, allergies, or worsening or changing symptoms.  Patient is to call with any side effects or complications from the treatments as a result of today.     Problem List:  Problem List[7]      No follow-ups on file.     Edmar Dodd MD    Please note  that portions of this note may have been completed with a voice recognition program. Efforts were made to edit the dictations but occasionally words are mis-transcribed.       [1]   Past Medical History:   Allergic rhinitis    Anxiety    Depression    Esophageal reflux    Exercise-induced asthma (HCC)    Hidradenitis suppurativa   [2]   Past Surgical History:  Procedure Laterality Date    Skin surgery  6/11/2015   [3]   Social History  Socioeconomic History    Marital status:    Tobacco Use    Smoking status: Never     Passive exposure: Never    Smokeless tobacco: Never   Vaping Use    Vaping status: Never Used   Substance and Sexual Activity    Alcohol use: Not Currently     Alcohol/week: 1.0 - 2.0 standard drink of alcohol     Types: 1 - 2 Cans of beer per week     Comment: Rarely    Drug use: Never    Sexual activity: Yes     Partners: Male   Other Topics Concern    Caffeine Concern Yes     Comment: 1 coffee/day    Stress Concern Yes    Weight Concern Yes     Comment: Trouble losing weight    Special Diet Yes     Comment: Mostly vegan    Exercise Yes     Comment: some 2-3x/week    Seat Belt Yes    Self-Exams Yes     Social Drivers of Health     Food Insecurity: No Food Insecurity (7/15/2025)    NCSS - Food Insecurity     Worried About Running Out of Food in the Last Year: No     Ran Out of Food in the Last Year: No   Transportation Needs: No Transportation Needs (7/15/2025)    NCSS - Transportation     Lack of Transportation: No   Housing Stability: Not At Risk (7/15/2025)    NCSS - Housing/Utilities     Has Housing: Yes     Worried About Losing Housing: No     Unable to Get Utilities: No   [4]   Family History  Problem Relation Age of Onset    Hypertension Father     Lipids Father     Cancer Maternal Grandfather         CLL    Diabetes Maternal Grandfather         Type 2, never on insulin    Heart Disorder Maternal Grandmother         A fib, heart failure    Lipids Maternal Grandmother     Hypertension  Maternal Grandmother     Pulmonary Disease Paternal Grandmother         Copd-chronic smoker   [5]   Allergies  Allergen Reactions    Dairy Products DIARRHEA and NAUSEA AND VOMITING    Lactose NAUSEA AND VOMITING    Gluten Meal RASH     Irritability   [6]   Current Outpatient Medications   Medication Sig Dispense Refill    clindamycin 1 % External Lotion 2 (two) times daily.      atorvastatin 40 MG Oral Tab Take 1 tablet (40 mg total) by mouth nightly. 90 tablet 0    escitalopram 5 MG Oral Tab Take 1 tablet (5 mg total) by mouth daily. 90 tablet 0    Red Yeast Rice Extract (RED YEAST RICE OR)       [place] 3 1200 MG Oral Cap       mupirocin 2 % External Ointment       cetirizine (ZYRTEC ALLERGY) 10 MG Oral Tab       lactase (LACTAID) 3000 units Oral Tab Take 1 tablet (3,000 Units total) by mouth.      famotidine (PEPCID) 20 MG Oral Tab Take 1 tablet (20 mg total) by mouth as needed.     [7]   Patient Active Problem List  Diagnosis    Allergic rhinitis    JOEL (generalized anxiety disorder)    Hyperhidrosis    Obesity (BMI 35.0-39.9 without comorbidity)

## 2025-07-18 NOTE — TELEPHONE ENCOUNTER
Incoming (mail or fax):  fax  Received from:  [facility]   Documentation given to:  results     Ultrasound head and neck soft tissue

## 2025-07-19 ENCOUNTER — ORDER TRANSCRIPTION (OUTPATIENT)
Dept: ADMINISTRATIVE | Facility: HOSPITAL | Age: 30
End: 2025-07-19

## 2025-07-19 DIAGNOSIS — Z13.6 SCREENING FOR CARDIOVASCULAR CONDITION: Primary | ICD-10-CM

## 2025-07-26 ENCOUNTER — HOSPITAL ENCOUNTER (OUTPATIENT)
Dept: CT IMAGING | Facility: HOSPITAL | Age: 30
Discharge: HOME | End: 2025-07-26
Attending: FAMILY MEDICINE

## 2025-07-26 VITALS — WEIGHT: 231 LBS | BODY MASS INDEX: 36.26 KG/M2 | HEIGHT: 67 IN

## 2025-07-26 DIAGNOSIS — Z13.6 SCREENING FOR CARDIOVASCULAR CONDITION: ICD-10-CM

## 2025-07-26 NOTE — PROGRESS NOTES
Date of Service 7/26/2025    ODILON ROUSE  Date of Birth 6/26/1995    Patient Age: 30 year old    PCP: Alfreda Ivan MD  [address] [place] [id number]    Heart Scan Consult  Preliminary Heart Scan Score: 0    Previous Screening  Heart Scan Completed Previously: No        Peripheral Vascular Scan Completed Previously: No          Risk Factors  Personal Risk Factors  Non-alterable Risk Factors: Personal History, Family History (Both parents have high cholesterol. Father had heart attack at 49 and has stents.)  Alterable Risk Factors: Abnormal Cholesterol, Lack of exercise, Obesity, Unhealthy eating      Body Mass Index  Body mass index is 36.18 kg/m².    Blood Pressure  Blood Pressure measurement declined during this encounter.  (Normal =< 120/80,  Elevated = 120-129/ >80,  High Stage1 130-139/80-89 , Stage2 >140/>90)    Lipid Profile  Cholesterol: 267, done on 7/3/2025.  HDL Cholesterol: 40, done on 7/3/2025.  LDL Cholesterol: 194, done on 7/3/2025.  TriGlycerides 177, done on 7/3/2025.    Cholesterol Goals  Value   Total  =< 200   HDL  = > 45 Men = > 55 Women   LDL   =< 100   Triglycerides  =< 150       Glucose and Hemoglobin A1C  Lab Results   Component Value Date    GLU 88 07/03/2025     (Normal Fasting Glucose < 100mg/dl )    Nurse Review  Risk factor information and results reviewed with Nurse: Yes    Recommended Follow Up:  Consult your physician regarding:: Final Heart Scan Report, Discuss potential for Incidental Finding, Discuss Potential for Score Variance      Recommendations for Change:  Nutrition Changes: Low Saturated Fat, Low Fat Dairy, Increase Fiber (Patient has recently changed her diet. Is now eating lean proteins like chicken and salmon, using olive oil for cooking. No dairy except low fat yogurt.)    Cholesterol Modification (goal of therapy depends upon your risk): Increase HDL (Healthy/Good) Normal >45 Men >55 Women, Decrease LDL (Lousy/Bad) Ideal <100, Decrease  Triglycerides (Ugly) Normal <150, Decrease Total Normal <200    Exercise: Initiate Program (Discussed making attainable goals. Has been walking and lives in a complex with gym.)    Smoking Cessation: No Change Needed    Weight Management: Decrease Current Weight    Stress Management: Adopt Stress Management Techniques    Repeat Heart Scan: 5 years if Calcium Score is 0.0, Discuss with your Physician              [place] Recommended Resources:  Recommended Resources: Upcoming Classes, Medical Services and Health Library [email or web address], [email or web address] [facility] [phone], [facility] [phone] ([facility])            Eli ARMSTRONG, JO        Please Contact the Nurse Heart Line with any Questions or Concerns [phone].